# Patient Record
Sex: FEMALE | Race: BLACK OR AFRICAN AMERICAN | NOT HISPANIC OR LATINO
[De-identification: names, ages, dates, MRNs, and addresses within clinical notes are randomized per-mention and may not be internally consistent; named-entity substitution may affect disease eponyms.]

---

## 2017-07-18 ENCOUNTER — FORM ENCOUNTER (OUTPATIENT)
Age: 72
End: 2017-07-18

## 2017-07-19 ENCOUNTER — OUTPATIENT (OUTPATIENT)
Dept: OUTPATIENT SERVICES | Facility: HOSPITAL | Age: 72
LOS: 1 days | End: 2017-07-19
Payer: MEDICARE

## 2017-07-19 ENCOUNTER — APPOINTMENT (OUTPATIENT)
Dept: SPINE | Facility: CLINIC | Age: 72
End: 2017-07-19

## 2017-07-19 VITALS
OXYGEN SATURATION: 95 % | SYSTOLIC BLOOD PRESSURE: 153 MMHG | HEART RATE: 81 BPM | HEIGHT: 68 IN | WEIGHT: 205 LBS | DIASTOLIC BLOOD PRESSURE: 94 MMHG | BODY MASS INDEX: 31.07 KG/M2

## 2017-07-19 DIAGNOSIS — Z90.89 ACQUIRED ABSENCE OF OTHER ORGANS: Chronic | ICD-10-CM

## 2017-07-19 DIAGNOSIS — M43.10 SPONDYLOLISTHESIS, SITE UNSPECIFIED: ICD-10-CM

## 2017-07-19 DIAGNOSIS — M48.06 SPINAL STENOSIS, LUMBAR REGION: ICD-10-CM

## 2017-07-19 DIAGNOSIS — Z98.89 OTHER SPECIFIED POSTPROCEDURAL STATES: Chronic | ICD-10-CM

## 2017-07-19 DIAGNOSIS — M54.5 LOW BACK PAIN: ICD-10-CM

## 2017-07-19 PROCEDURE — 72100 X-RAY EXAM L-S SPINE 2/3 VWS: CPT | Mod: 26

## 2017-07-19 PROCEDURE — 72100 X-RAY EXAM L-S SPINE 2/3 VWS: CPT

## 2019-02-19 ENCOUNTER — APPOINTMENT (OUTPATIENT)
Dept: UROGYNECOLOGY | Facility: CLINIC | Age: 74
End: 2019-02-19
Payer: MEDICARE

## 2019-02-19 VITALS
OXYGEN SATURATION: 98 % | HEIGHT: 68 IN | BODY MASS INDEX: 31.07 KG/M2 | SYSTOLIC BLOOD PRESSURE: 156 MMHG | WEIGHT: 205 LBS | HEART RATE: 84 BPM | TEMPERATURE: 98.4 F | DIASTOLIC BLOOD PRESSURE: 98 MMHG

## 2019-02-19 DIAGNOSIS — Z80.3 FAMILY HISTORY OF MALIGNANT NEOPLASM OF BREAST: ICD-10-CM

## 2019-02-19 DIAGNOSIS — R39.13 SPLITTING OF URINARY STREAM: ICD-10-CM

## 2019-02-19 DIAGNOSIS — Z87.828 PERSONAL HISTORY OF OTHER (HEALED) PHYSICAL INJURY AND TRAUMA: ICD-10-CM

## 2019-02-19 DIAGNOSIS — Z83.3 FAMILY HISTORY OF DIABETES MELLITUS: ICD-10-CM

## 2019-02-19 DIAGNOSIS — N95.2 POSTMENOPAUSAL ATROPHIC VAGINITIS: ICD-10-CM

## 2019-02-19 DIAGNOSIS — Z82.49 FAMILY HISTORY OF ISCHEMIC HEART DISEASE AND OTHER DISEASES OF THE CIRCULATORY SYSTEM: ICD-10-CM

## 2019-02-19 LAB
BILIRUB UR QL STRIP: NORMAL
CLARITY UR: CLEAR
COLLECTION METHOD: NORMAL
GLUCOSE UR-MCNC: NORMAL
HCG UR QL: 1 EU/DL
HGB UR QL STRIP.AUTO: NORMAL
KETONES UR-MCNC: NORMAL
LEUKOCYTE ESTERASE UR QL STRIP: NORMAL
NITRITE UR QL STRIP: NORMAL
PH UR STRIP: 6.5
PROT UR STRIP-MCNC: NORMAL
SP GR UR STRIP: 1.01

## 2019-02-19 PROCEDURE — 99204 OFFICE O/P NEW MOD 45 MIN: CPT | Mod: 25

## 2019-02-19 PROCEDURE — 51725 SIMPLE CYSTOMETROGRAM: CPT

## 2019-02-19 PROCEDURE — 51736 URINE FLOW MEASUREMENT: CPT

## 2019-02-19 RX ORDER — ESTRADIOL 0.1 MG/G
0.1 CREAM VAGINAL
Qty: 1 | Refills: 0 | Status: ACTIVE | COMMUNITY
Start: 2019-02-19 | End: 1900-01-01

## 2019-02-19 NOTE — DISCUSSION/SUMMARY
[FreeTextEntry1] : 73 YEAR OLD WITH UTERINE PROLAPSE, ATROPHY AND POTENTIAL INCONTINENCE.  WE DISCUSSED SURGICAL AND NO SURGEICAL OPTIONS.  SHE REQUESTS SURGICAL INTERVENTION.  ANATOMY LIKELY ACCEPTABLE FOR VAGINAL HYSTERECTOMY AND SUSPENSION.  uLTRASOUND ORDERED FOR UTERINE DIMENSIONS.  \par \par URODYNAMICS, CYSTOSCOPY, AND PRE-OPERATIVE COUNSELING ARE INDICATED.

## 2019-02-19 NOTE — PROCEDURE
[First Sensation: _____ ml] : first sensation at [unfilled] ml [Fullness: ____ ml] : fullness at [unfilled] ml [FPC ____ml] : FPC [unfilled] ml [Sensory Urgency] : sensory urgency was observed [Involuntary Detrusor Contraction] : involuntary detrusor contraction was observed [FreeTextEntry1] : LOW CAPACITY, DETRUSOR INSTABILITY

## 2019-02-19 NOTE — HISTORY OF PRESENT ILLNESS
[Cystocele (Obstetric)] : frequent [Rectal Prolapse] : frequent [Feelings Of Urinary Urgency] : frequent [Pain During Urination (Dysuria)] : frequent [] : months ago [Vaginal Pain] : rare [de-identified] : from bulge sensation

## 2019-02-20 PROBLEM — Z87.828 HISTORY OF BACK INJURY: Status: RESOLVED | Noted: 2019-02-20 | Resolved: 2019-02-20

## 2019-02-20 PROBLEM — Z80.3 FAMILY HISTORY OF MALIGNANT NEOPLASM OF BREAST: Status: ACTIVE | Noted: 2019-02-20

## 2019-02-20 PROBLEM — Z83.3 FAMILY HISTORY OF DIABETES MELLITUS: Status: ACTIVE | Noted: 2019-02-20

## 2019-02-20 PROBLEM — Z82.49 FAMILY HISTORY OF HYPERTENSION: Status: ACTIVE | Noted: 2019-02-20

## 2019-02-20 LAB
APPEARANCE: CLEAR
BACTERIA: NEGATIVE
BILIRUBIN URINE: NEGATIVE
BLOOD URINE: NEGATIVE
COLOR: YELLOW
GLUCOSE QUALITATIVE U: NEGATIVE
HYALINE CASTS: 0 /LPF
KETONES URINE: NEGATIVE
LEUKOCYTE ESTERASE URINE: NEGATIVE
MICROSCOPIC-UA: NORMAL
NITRITE URINE: NEGATIVE
PH URINE: 6.5
PROTEIN URINE: NEGATIVE
RED BLOOD CELLS URINE: 2 /HPF
SPECIFIC GRAVITY URINE: 1.02
SQUAMOUS EPITHELIAL CELLS: 0 /HPF
UROBILINOGEN URINE: NORMAL
WHITE BLOOD CELLS URINE: 0 /HPF

## 2019-02-21 ENCOUNTER — RESULT REVIEW (OUTPATIENT)
Age: 74
End: 2019-02-21

## 2019-02-21 LAB — BACTERIA UR CULT: NORMAL

## 2019-03-08 ENCOUNTER — OUTPATIENT (OUTPATIENT)
Dept: OUTPATIENT SERVICES | Facility: HOSPITAL | Age: 74
LOS: 1 days | End: 2019-03-08
Payer: MEDICARE

## 2019-03-08 ENCOUNTER — APPOINTMENT (OUTPATIENT)
Dept: UROGYNECOLOGY | Facility: CLINIC | Age: 74
End: 2019-03-08
Payer: MEDICARE

## 2019-03-08 DIAGNOSIS — R35.0 FREQUENCY OF MICTURITION: ICD-10-CM

## 2019-03-08 DIAGNOSIS — Z90.89 ACQUIRED ABSENCE OF OTHER ORGANS: Chronic | ICD-10-CM

## 2019-03-08 DIAGNOSIS — Z98.89 OTHER SPECIFIED POSTPROCEDURAL STATES: Chronic | ICD-10-CM

## 2019-03-08 PROCEDURE — 52000 CYSTOURETHROSCOPY: CPT

## 2019-03-11 ENCOUNTER — OUTPATIENT (OUTPATIENT)
Dept: OUTPATIENT SERVICES | Facility: HOSPITAL | Age: 74
LOS: 1 days | End: 2019-03-11
Payer: MEDICARE

## 2019-03-11 ENCOUNTER — APPOINTMENT (OUTPATIENT)
Dept: UROGYNECOLOGY | Facility: CLINIC | Age: 74
End: 2019-03-11
Payer: MEDICARE

## 2019-03-11 ENCOUNTER — RESULT REVIEW (OUTPATIENT)
Age: 74
End: 2019-03-11

## 2019-03-11 VITALS
RESPIRATION RATE: 20 BRPM | HEART RATE: 73 BPM | TEMPERATURE: 98 F | DIASTOLIC BLOOD PRESSURE: 86 MMHG | SYSTOLIC BLOOD PRESSURE: 150 MMHG | HEIGHT: 68 IN | OXYGEN SATURATION: 97 % | WEIGHT: 205.91 LBS

## 2019-03-11 DIAGNOSIS — Z01.818 ENCOUNTER FOR OTHER PREPROCEDURAL EXAMINATION: ICD-10-CM

## 2019-03-11 DIAGNOSIS — N81.4 UTEROVAGINAL PROLAPSE, UNSPECIFIED: ICD-10-CM

## 2019-03-11 DIAGNOSIS — Z90.89 ACQUIRED ABSENCE OF OTHER ORGANS: Chronic | ICD-10-CM

## 2019-03-11 DIAGNOSIS — N99.3 PROLAPSE OF VAGINAL VAULT AFTER HYSTERECTOMY: ICD-10-CM

## 2019-03-11 DIAGNOSIS — Z98.89 OTHER SPECIFIED POSTPROCEDURAL STATES: Chronic | ICD-10-CM

## 2019-03-11 DIAGNOSIS — E87.6 HYPOKALEMIA: ICD-10-CM

## 2019-03-11 DIAGNOSIS — Z29.9 ENCOUNTER FOR PROPHYLACTIC MEASURES, UNSPECIFIED: ICD-10-CM

## 2019-03-11 DIAGNOSIS — I10 ESSENTIAL (PRIMARY) HYPERTENSION: ICD-10-CM

## 2019-03-11 DIAGNOSIS — R39.15 URGENCY OF URINATION: ICD-10-CM

## 2019-03-11 DIAGNOSIS — N39.3 STRESS INCONTINENCE (FEMALE) (MALE): ICD-10-CM

## 2019-03-11 LAB
BLD GP AB SCN SERPL QL: NEGATIVE — SIGNIFICANT CHANGE UP
HBA1C BLD-MCNC: 5.9 % — HIGH (ref 4–5.6)
POTASSIUM SERPL-MCNC: 4.2 MMOL/L — SIGNIFICANT CHANGE UP (ref 3.5–5.3)
POTASSIUM SERPL-SCNC: 4.2 MMOL/L — SIGNIFICANT CHANGE UP (ref 3.5–5.3)
RH IG SCN BLD-IMP: POSITIVE — SIGNIFICANT CHANGE UP

## 2019-03-11 PROCEDURE — 51729 CYSTOMETROGRAM W/VP&UP: CPT

## 2019-03-11 PROCEDURE — G0463: CPT

## 2019-03-11 PROCEDURE — 51729 CYSTOMETROGRAM W/VP&UP: CPT | Mod: 26

## 2019-03-11 PROCEDURE — 51797 INTRAABDOMINAL PRESSURE TEST: CPT | Mod: 26

## 2019-03-11 PROCEDURE — 86900 BLOOD TYPING SEROLOGIC ABO: CPT

## 2019-03-11 PROCEDURE — 84132 ASSAY OF SERUM POTASSIUM: CPT

## 2019-03-11 PROCEDURE — 86901 BLOOD TYPING SEROLOGIC RH(D): CPT

## 2019-03-11 PROCEDURE — 51784 ANAL/URINARY MUSCLE STUDY: CPT | Mod: 26

## 2019-03-11 PROCEDURE — 51784 ANAL/URINARY MUSCLE STUDY: CPT

## 2019-03-11 PROCEDURE — 86850 RBC ANTIBODY SCREEN: CPT

## 2019-03-11 PROCEDURE — 83036 HEMOGLOBIN GLYCOSYLATED A1C: CPT

## 2019-03-11 PROCEDURE — 51797 INTRAABDOMINAL PRESSURE TEST: CPT

## 2019-03-11 RX ORDER — CEFOTETAN DISODIUM 1 G
2 VIAL (EA) INJECTION ONCE
Qty: 0 | Refills: 0 | Status: DISCONTINUED | OUTPATIENT
Start: 2019-03-25 | End: 2019-03-29

## 2019-03-11 NOTE — H&P PST ADULT - MALLAMPATI CLASS
Class II - visualization of the soft palate, fauces, and uvula 14.25 inch neck/Class II - visualization of the soft palate, fauces, and uvula

## 2019-03-11 NOTE — H&P PST ADULT - PROBLEM SELECTOR PLAN 1
Midurethral Sling, Cystoscopy ,Anterior  colporrhaphy Total Vaginal Hysterectomy Uterosacral Suspension on 3/25/2019.    Pre- Op instruction discussed  CBc,BMP, reviewed on file   type and screen HgA1c sent ,Potassium repeated

## 2019-03-11 NOTE — H&P PST ADULT - HISTORY OF PRESENT ILLNESS
72 y/o female with PMH of HTN, lumbar stenosis s/p laminectomy  and fusion x 2(2012,2016) . Pt has been experiencing  vaginal bulging followed by GYN . Presents to PSt for scheduled Midurethral Sling, Cystoscopy ,Anterior  colporrhaphy Total Vaginal Hysterectomy Uterosacral Suspension on 3/25/2019. 74 y/o female with PMH of HTN, lumbar stenosis s/p laminectomy  and fusion x 2(2012,2016) . Pt has been experiencing  vaginal bulging increased urinary frequency, incontinence followed by GYN . Presents to PST for scheduled Midurethral Sling, Cystoscopy ,Anterior  colporrhaphy Total Vaginal Hysterectomy Uterosacral Suspension on 3/25/2019. 74 y/o female with PMH of HTN, lumbar stenosis s/p laminectomy  and fusion x 2(2012,2016). Pt has been experiencing  vaginal bulging increased urinary frequency, incontinence followed by GYN. Presents to PST for scheduled Midurethral Sling, Cystoscopy ,Anterior  colporrhaphy Total Vaginal Hysterectomy Uterosacral Suspension on 3/25/2019. 74 y/o female with PMH of HTN, lumbar stenosis s/p laminectomy  and fusion x 2(2012,2016). Pt has been experiencing vaginal bulging increased urinary frequency, incontinence followed by GYN. Presents to PST for scheduled Midurethral Sling, Cystoscopy ,Anterior  colporrhaphy Total Vaginal Hysterectomy Uterosacral Suspension on 3/25/2019.

## 2019-03-11 NOTE — H&P PST ADULT - PMH
Asthma  Pt has not had asthma in 5 years  H/O coronary angioplasty    HTN (hypertension)  on meds  Lumbar spinal stenosis    Osteoporosis Asthma  Pt has not had asthma in 5 years  Cystocele, midline    HTN (hypertension)  on meds  Lumbar spinal stenosis    Osteoporosis    Stress incontinence in female    Uterine prolapse

## 2019-03-11 NOTE — H&P PST ADULT - ASSESSMENT
CAPRINI SCORE [CLOT updated 18]    AGE RELATED RISK FACTORS                                                       MOBILITY RELATED FACTORS  [ ] Age 41-60 years                                            (1 Point)                    [ ] Bed rest                                                        (1 Point)  [ x] Age: 61-74 years                                           (2 Points)                  [ ] Plaster cast                                                   (2 Points)  [ ] Age= 75 years                                              (3 Points)                    [ ] Bed bound for more than 72 hours                 (2 Points)    DISEASE RELATED RISK FACTORS                                               GENDER SPECIFIC FACTORS  [ x] Edema in the lower extremities                       (1 Point)              [ ] Pregnancy                                                     (1 Point)  [ ] Varicose veins                                               (1 Point)                     [ ] Post-partum < 6 weeks                                   (1 Point)             [x ] BMI > 25 Kg/m2                                            (1 Point)                     [ ] Hormonal therapy  or oral contraception          (1 Point)                 [ ] Sepsis (in the previous month)                        (1 Point)               [ ] History of pregnancy complications                 (1 point)  [ ] Pneumonia or serious lung disease                                               [ ] Unexplained or recurrent                     (1 Point)           (in the previous month)                               (1 Point)  [ ] Abnormal pulmonary function test                     (1 Point)                 SURGERY RELATED RISK FACTORS  [ ] Acute myocardial infarction                              (1 Point)               [ ]  Section                                             (1 Point)  [ ] Congestive heart failure (in the previous month)  (1 Point)      [ ] Minor surgery                                                  (1 Point)   [ ] Inflammatory bowel disease                             (1 Point)               [ ] Arthroscopic surgery                                        (2 Points)  [ ] Central venous access                                      (2 Points)                [x ] General surgery lasting more than 45 minutes (2 points)  [ ] Present or previous malignancy                     (2 Points)                [ ] Elective arthroplasty                                         (5 points)    [ ] Stroke (in the previous month)                          (5 Points)                                                                                                                                                           HEMATOLOGY RELATED FACTORS                                                 TRAUMA RELATED RISK FACTORS  [ ] Prior episodes of VTE                                     (3 Points)                [ ] Fracture of the hip, pelvis, or leg                       (5 Points)  [ ] Positive family history for VTE                         (3 Points)             [ ] Acute spinal cord injury (in the previous month)  (5 Points)  [ ] Prothrombin 90986 A                                     (3 Points)               [ ] Paralysis  (less than 1 month)                             (5 Points)  [ ] Factor V Leiden                                             (3 Points)                  [ ] Multiple Trauma within 1 month                        (5 Points)  [ ] Lupus anticoagulants                                     (3 Points)                                                           [ ] Anticardiolipin antibodies                               (3 Points)                                                       [ ] High homocysteine in the blood                      (3 Points)                                             [ ] Other congenital or acquired thrombophilia      (3 Points)                                                [ ] Heparin induced thrombocytopenia                  (3 Points)                                     Total Score [    6      ]

## 2019-03-11 NOTE — H&P PST ADULT - PSH
H/O adenoidectomy    H/O laminectomy  L4-L5- 2012 , 2016  S/P colonoscopy  2004 and 2009,2017  S/P tonsillectomy  1974  S/P tubal ligation  1977

## 2019-03-11 NOTE — H&P PST ADULT - ATTENDING COMMENTS
Patient with advanced pelvic prolapse and potential incontinence admitted for vaginal hysterectomy, uterosacral suspension, anterior repair, sling, possible posterior repair.  Risks benefits and alternatives reviewed in detail.

## 2019-03-12 ENCOUNTER — APPOINTMENT (OUTPATIENT)
Dept: UROGYNECOLOGY | Facility: CLINIC | Age: 74
End: 2019-03-12
Payer: MEDICARE

## 2019-03-12 DIAGNOSIS — R39.15 URGENCY OF URINATION: ICD-10-CM

## 2019-03-12 DIAGNOSIS — R35.0 FREQUENCY OF MICTURITION: ICD-10-CM

## 2019-03-12 DIAGNOSIS — N81.9 FEMALE GENITAL PROLAPSE, UNSPECIFIED: ICD-10-CM

## 2019-03-12 PROCEDURE — 99214 OFFICE O/P EST MOD 30 MIN: CPT

## 2019-03-12 NOTE — DISCUSSION/SUMMARY
[FreeTextEntry1] : 73 year old with pelvic prolapse here for surgery discussion and counseling.  All surgical and non surgical options reviewed.  She elects for vaginal hysterectomy, uterosacral suspension and sling.  \par \par  She has been counseled regarding options for reconstructive surgery. This includes both abdominal, laparoscopic, robotic,and vaginal options. She has elected for the vaginal approach to surgery. Based on our discussion she will have a vaginal hysterectomy, uterosacral ligament suspension and anterior and posterior repair. The options for repairs with and without the use of mesh and biological materials were reviewed. Based on our discussion she would like to go with a native tissue repair. For her stress incontinence, she desires a midurethral sling with mesh. She expressed understanding that mesh will be used for the antiincontinence procedure. We discussed success rates, failure rates, and possible risks. The risks discussed include, but are not limited to: changes to the vaginal anatomy, vaginal pain, bleeding, pelvic pain, dyspareunia, need for revision, vaginal discharge, urinary retention, development or worsening of stress urinary incontinence or urge incontinence, infection, failure of the procedure, neuropathy. We also extensively reviewed the risk of injury to the bowel, rectum, bladder, ureters, urethra, blood vessels, and nerves. We discussed the risk of sling mesh erosion and need for sling revision. We discussed the risk of fistula formation and possible conversion to laparotomy or laparoscopy. We also discussed possible change in bowel habits, with improvement or worsening of constipation. We discussed the possibility of removing the ovaries/fallopian tubes and we indicated the ovaries would be removed only if they appeared grossly abnormal, otherwise they would remain in-situ.  She understands that retaining the ovaries does maintain a risk of ovarian cancer. All risks were explained in layman's terms.  Based on our discussion she would like to proceed with transvaginal hysterectomy, uterosacral suspension and anterior and posterior repair, midurethral sling, cystoscopy. We reviewed the preoperative and postoperative instructions as well as hospital stay. Consent was signed in the office. IUGA information on recovery from vaginal surgery provided and written pre and postop instructions provided. \par \par \par Patient requires sonogram for uterine size. \par \par

## 2019-03-14 PROBLEM — N39.3 STRESS INCONTINENCE (FEMALE) (MALE): Chronic | Status: ACTIVE | Noted: 2019-03-11

## 2019-03-14 PROBLEM — N81.4 UTEROVAGINAL PROLAPSE, UNSPECIFIED: Chronic | Status: ACTIVE | Noted: 2019-03-11

## 2019-03-14 PROBLEM — N81.11 CYSTOCELE, MIDLINE: Chronic | Status: ACTIVE | Noted: 2019-03-11

## 2019-03-15 ENCOUNTER — OUTPATIENT (OUTPATIENT)
Dept: OUTPATIENT SERVICES | Facility: HOSPITAL | Age: 74
LOS: 1 days | End: 2019-03-15
Payer: MEDICARE

## 2019-03-15 ENCOUNTER — APPOINTMENT (OUTPATIENT)
Dept: ULTRASOUND IMAGING | Facility: HOSPITAL | Age: 74
End: 2019-03-15
Payer: MEDICARE

## 2019-03-15 DIAGNOSIS — N81.9 FEMALE GENITAL PROLAPSE, UNSPECIFIED: ICD-10-CM

## 2019-03-15 DIAGNOSIS — Z98.89 OTHER SPECIFIED POSTPROCEDURAL STATES: Chronic | ICD-10-CM

## 2019-03-15 DIAGNOSIS — Z90.89 ACQUIRED ABSENCE OF OTHER ORGANS: Chronic | ICD-10-CM

## 2019-03-15 PROCEDURE — 76830 TRANSVAGINAL US NON-OB: CPT

## 2019-03-15 PROCEDURE — 76856 US EXAM PELVIC COMPLETE: CPT

## 2019-03-15 PROCEDURE — 76830 TRANSVAGINAL US NON-OB: CPT | Mod: 26

## 2019-03-15 PROCEDURE — 76856 US EXAM PELVIC COMPLETE: CPT | Mod: 26

## 2019-03-24 ENCOUNTER — TRANSCRIPTION ENCOUNTER (OUTPATIENT)
Age: 74
End: 2019-03-24

## 2019-03-25 ENCOUNTER — INPATIENT (INPATIENT)
Facility: HOSPITAL | Age: 74
LOS: 3 days | Discharge: HOME | DRG: 743 | End: 2019-03-29
Attending: UROLOGY | Admitting: UROLOGY
Payer: MEDICARE

## 2019-03-25 ENCOUNTER — RESULT REVIEW (OUTPATIENT)
Age: 74
End: 2019-03-25

## 2019-03-25 ENCOUNTER — APPOINTMENT (OUTPATIENT)
Dept: UROGYNECOLOGY | Facility: HOSPITAL | Age: 74
End: 2019-03-25
Payer: MEDICARE

## 2019-03-25 VITALS
TEMPERATURE: 99 F | OXYGEN SATURATION: 99 % | WEIGHT: 205.03 LBS | RESPIRATION RATE: 19 BRPM | DIASTOLIC BLOOD PRESSURE: 90 MMHG | HEART RATE: 80 BPM | SYSTOLIC BLOOD PRESSURE: 148 MMHG | HEIGHT: 68 IN

## 2019-03-25 DIAGNOSIS — N39.3 STRESS INCONTINENCE (FEMALE) (MALE): ICD-10-CM

## 2019-03-25 DIAGNOSIS — Z90.89 ACQUIRED ABSENCE OF OTHER ORGANS: Chronic | ICD-10-CM

## 2019-03-25 DIAGNOSIS — N99.3 PROLAPSE OF VAGINAL VAULT AFTER HYSTERECTOMY: ICD-10-CM

## 2019-03-25 DIAGNOSIS — Z98.89 OTHER SPECIFIED POSTPROCEDURAL STATES: Chronic | ICD-10-CM

## 2019-03-25 LAB
ALBUMIN SERPL ELPH-MCNC: 4.3 G/DL — SIGNIFICANT CHANGE UP (ref 3.3–5)
ALP SERPL-CCNC: 85 U/L — SIGNIFICANT CHANGE UP (ref 40–120)
ALT FLD-CCNC: 18 U/L — SIGNIFICANT CHANGE UP (ref 10–45)
ANION GAP SERPL CALC-SCNC: 18 MMOL/L — HIGH (ref 5–17)
AST SERPL-CCNC: 29 U/L — SIGNIFICANT CHANGE UP (ref 10–40)
BILIRUB SERPL-MCNC: 0.4 MG/DL — SIGNIFICANT CHANGE UP (ref 0.2–1.2)
BUN SERPL-MCNC: 10 MG/DL — SIGNIFICANT CHANGE UP (ref 7–23)
CALCIUM SERPL-MCNC: 9.2 MG/DL — SIGNIFICANT CHANGE UP (ref 8.4–10.5)
CHLORIDE SERPL-SCNC: 98 MMOL/L — SIGNIFICANT CHANGE UP (ref 96–108)
CO2 SERPL-SCNC: 20 MMOL/L — LOW (ref 22–31)
CREAT SERPL-MCNC: 0.66 MG/DL — SIGNIFICANT CHANGE UP (ref 0.5–1.3)
GLUCOSE BLDC GLUCOMTR-MCNC: 95 MG/DL — SIGNIFICANT CHANGE UP (ref 70–99)
GLUCOSE SERPL-MCNC: 166 MG/DL — HIGH (ref 70–99)
HCT VFR BLD CALC: 42.2 % — SIGNIFICANT CHANGE UP (ref 34.5–45)
HGB BLD-MCNC: 14.8 G/DL — SIGNIFICANT CHANGE UP (ref 11.5–15.5)
MCHC RBC-ENTMCNC: 30.9 PG — SIGNIFICANT CHANGE UP (ref 27–34)
MCHC RBC-ENTMCNC: 35 GM/DL — SIGNIFICANT CHANGE UP (ref 32–36)
MCV RBC AUTO: 88.3 FL — SIGNIFICANT CHANGE UP (ref 80–100)
PLATELET # BLD AUTO: 165 K/UL — SIGNIFICANT CHANGE UP (ref 150–400)
POTASSIUM SERPL-MCNC: 3.6 MMOL/L — SIGNIFICANT CHANGE UP (ref 3.5–5.3)
POTASSIUM SERPL-SCNC: 3.6 MMOL/L — SIGNIFICANT CHANGE UP (ref 3.5–5.3)
PROT SERPL-MCNC: 8.1 G/DL — SIGNIFICANT CHANGE UP (ref 6–8.3)
RBC # BLD: 4.78 M/UL — SIGNIFICANT CHANGE UP (ref 3.8–5.2)
RBC # FLD: 12.9 % — SIGNIFICANT CHANGE UP (ref 10.3–14.5)
SODIUM SERPL-SCNC: 136 MMOL/L — SIGNIFICANT CHANGE UP (ref 135–145)
WBC # BLD: 8.8 K/UL — SIGNIFICANT CHANGE UP (ref 3.8–10.5)
WBC # FLD AUTO: 8.8 K/UL — SIGNIFICANT CHANGE UP (ref 3.8–10.5)

## 2019-03-25 PROCEDURE — 57288 REPAIR BLADDER DEFECT: CPT

## 2019-03-25 PROCEDURE — 58260 VAGINAL HYSTERECTOMY: CPT

## 2019-03-25 PROCEDURE — 57265 CMBN AP COLPRHY W/NTRCL RPR: CPT

## 2019-03-25 PROCEDURE — 88305 TISSUE EXAM BY PATHOLOGIST: CPT | Mod: 26

## 2019-03-25 RX ORDER — ONDANSETRON 8 MG/1
4 TABLET, FILM COATED ORAL ONCE
Qty: 0 | Refills: 0 | Status: COMPLETED | OUTPATIENT
Start: 2019-03-25 | End: 2019-03-25

## 2019-03-25 RX ORDER — HYDROMORPHONE HYDROCHLORIDE 2 MG/ML
0.25 INJECTION INTRAMUSCULAR; INTRAVENOUS; SUBCUTANEOUS
Qty: 0 | Refills: 0 | Status: DISCONTINUED | OUTPATIENT
Start: 2019-03-25 | End: 2019-03-25

## 2019-03-25 RX ORDER — LIDOCAINE HCL 20 MG/ML
0.2 VIAL (ML) INJECTION ONCE
Qty: 0 | Refills: 0 | Status: DISCONTINUED | OUTPATIENT
Start: 2019-03-25 | End: 2019-03-25

## 2019-03-25 RX ORDER — ACETAMINOPHEN 500 MG
1000 TABLET ORAL ONCE
Qty: 0 | Refills: 0 | Status: COMPLETED | OUTPATIENT
Start: 2019-03-25 | End: 2019-03-25

## 2019-03-25 RX ORDER — KETOROLAC TROMETHAMINE 30 MG/ML
15 SYRINGE (ML) INJECTION EVERY 6 HOURS
Qty: 0 | Refills: 0 | Status: DISCONTINUED | OUTPATIENT
Start: 2019-03-25 | End: 2019-03-26

## 2019-03-25 RX ORDER — HYDRALAZINE HCL 50 MG
5 TABLET ORAL ONCE
Qty: 0 | Refills: 0 | Status: COMPLETED | OUTPATIENT
Start: 2019-03-25 | End: 2019-03-25

## 2019-03-25 RX ORDER — SIMETHICONE 80 MG/1
80 TABLET, CHEWABLE ORAL
Qty: 0 | Refills: 0 | Status: DISCONTINUED | OUTPATIENT
Start: 2019-03-25 | End: 2019-03-29

## 2019-03-25 RX ORDER — SODIUM CHLORIDE 9 MG/ML
1000 INJECTION, SOLUTION INTRAVENOUS
Qty: 0 | Refills: 0 | Status: DISCONTINUED | OUTPATIENT
Start: 2019-03-25 | End: 2019-03-28

## 2019-03-25 RX ORDER — SODIUM CHLORIDE 9 MG/ML
1000 INJECTION, SOLUTION INTRAVENOUS
Qty: 0 | Refills: 0 | Status: DISCONTINUED | OUTPATIENT
Start: 2019-03-25 | End: 2019-03-25

## 2019-03-25 RX ORDER — CYCLOBENZAPRINE HYDROCHLORIDE 10 MG/1
5 TABLET, FILM COATED ORAL
Qty: 0 | Refills: 0 | Status: DISCONTINUED | OUTPATIENT
Start: 2019-03-25 | End: 2019-03-26

## 2019-03-25 RX ORDER — ACETAMINOPHEN 500 MG
1000 TABLET ORAL ONCE
Qty: 0 | Refills: 0 | Status: DISCONTINUED | OUTPATIENT
Start: 2019-03-25 | End: 2019-03-26

## 2019-03-25 RX ORDER — SODIUM CHLORIDE 9 MG/ML
3 INJECTION INTRAMUSCULAR; INTRAVENOUS; SUBCUTANEOUS EVERY 8 HOURS
Qty: 0 | Refills: 0 | Status: DISCONTINUED | OUTPATIENT
Start: 2019-03-25 | End: 2019-03-25

## 2019-03-25 RX ORDER — ENOXAPARIN SODIUM 100 MG/ML
40 INJECTION SUBCUTANEOUS EVERY 24 HOURS
Qty: 0 | Refills: 0 | Status: DISCONTINUED | OUTPATIENT
Start: 2019-03-25 | End: 2019-03-29

## 2019-03-25 RX ORDER — ACETAMINOPHEN 500 MG
1000 TABLET ORAL ONCE
Qty: 0 | Refills: 0 | Status: COMPLETED | OUTPATIENT
Start: 2019-03-26 | End: 2019-03-26

## 2019-03-25 RX ORDER — FAMOTIDINE 10 MG/ML
20 INJECTION INTRAVENOUS ONCE
Qty: 0 | Refills: 0 | Status: COMPLETED | OUTPATIENT
Start: 2019-03-25 | End: 2019-03-25

## 2019-03-25 RX ADMIN — Medication 15 MILLIGRAM(S): at 23:08

## 2019-03-25 RX ADMIN — ONDANSETRON 4 MILLIGRAM(S): 8 TABLET, FILM COATED ORAL at 15:39

## 2019-03-25 RX ADMIN — FAMOTIDINE 20 MILLIGRAM(S): 10 INJECTION INTRAVENOUS at 08:22

## 2019-03-25 RX ADMIN — Medication 1000 MILLIGRAM(S): at 16:04

## 2019-03-25 RX ADMIN — ENOXAPARIN SODIUM 40 MILLIGRAM(S): 100 INJECTION SUBCUTANEOUS at 23:08

## 2019-03-25 RX ADMIN — CYCLOBENZAPRINE HYDROCHLORIDE 5 MILLIGRAM(S): 10 TABLET, FILM COATED ORAL at 17:22

## 2019-03-25 RX ADMIN — Medication 400 MILLIGRAM(S): at 15:36

## 2019-03-25 RX ADMIN — ONDANSETRON 4 MILLIGRAM(S): 8 TABLET, FILM COATED ORAL at 23:32

## 2019-03-25 RX ADMIN — Medication 15 MILLIGRAM(S): at 17:27

## 2019-03-25 RX ADMIN — ONDANSETRON 4 MILLIGRAM(S): 8 TABLET, FILM COATED ORAL at 19:07

## 2019-03-25 RX ADMIN — Medication 5 MILLIGRAM(S): at 16:36

## 2019-03-25 RX ADMIN — Medication 15 MILLIGRAM(S): at 17:09

## 2019-03-25 NOTE — PROGRESS NOTE ADULT - SUBJECTIVE AND OBJECTIVE BOX
POST-OP CHECK  PA Note    Allergies    methylPREDNISolone (Nausea; Other; Vomiting)  Perfumes- Pt gets asthma (Short breath)    S:  Pt sleeping, easily arousable  Pt c/o lower abdominal pain and right thigh pain- just received dilaudid. Pt denies N/V, SOB, CP, or palpitations. Denies passing Flatus.  Pt still NPO.  Herrera in place    O:   T(C): 36 (03-25-19 @ 15:06), Max: 36 (03-25-19 @ 15:06)  HR: 75 (03-25-19 @ 17:00) (56 - 75)  BP: 151/78 (03-25-19 @ 17:00) (133/- - 173/86)  RR: 18 (03-25-19 @ 17:00) (14 - 18)  SpO2: 100% (03-25-19 @ 17:00) (94% - 100%)  I&O's Summary    25 Mar 2019 07:01  -  25 Mar 2019 17:07  --------------------------------------------------------  IN: 250 mL / OUT: 300 mL / NET: -50 mL                         OR           PACU  (I)                700              IVF LR@75  (O)                                  300cc over 2 hours in PACU  EBL               100    CV: RRR  Lungs: CTA B/L  Abd: +BS, soft, appropriately tender  Ext: SCDs in place, Neg Homans B/L, right thigh pain not reproducible    A/P: 73y Female S/P total vaginal hysterectomy, TVT, uterosacral suspension, cystoscopy POD #0  with  PAST MEDICAL & SURGICAL HISTORY:  Uterine prolapse  Cystocele, midline  Stress incontinence in female  Osteoporosis  Asthma: Pt has not had asthma in 5 years  HTN (hypertension): on meds  Lumbar spinal stenosis  H/O adenoidectomy  H/O laminectomy: L4-L5- 2012 , 2016  S/P colonoscopy: 2004 and 2009,2017  S/P tubal ligation: 1977  S/P tonsillectomy: 1974      -Neuro - AAO x 3, Dilaudid given for pain, will re evaluate pt pain controll  -Heme - clinically hemodynamically stable  -CV - asymptomatic, CBC in am, will hold HTN medication and restart in am  -Pulm - encourage IS, O2sat 100% on RA  -GI - Diet-  Regular as Tolerated  - - Herrera to gravity, will remove vaginal packing x 1 and perform active TOV in am  -DVT prophylaxis - SCDs in place, encourage ambulation, lovenox to be started 8 hours postop  -Pt with right thigh pain likely related to positioning in surgery, pain not reproducible on exam, will continue to observe  -Meds:   acetaminophen  IVPB .. 1000 milliGRAM(s) IV Intermittent once  cefoTEtan  IVPB 2 Gram(s) IV Intermittent once  cyclobenzaprine 5 milliGRAM(s) Oral two times a day PRN  enoxaparin Injectable 40 milliGRAM(s) SubCutaneous every 24 hours  HYDROmorphone  Injectable 0.25 milliGRAM(s) IV Push every 10 minutes PRN  ketorolac   Injectable 15 milliGRAM(s) IV Push every 6 hours  lactated ringers. 1000 milliLiter(s) IV Continuous <Continuous>  lactated ringers. 1000 milliLiter(s) IV Continuous <Continuous>    -Dispo: stable for transfer from PACU, once meeting all PACU milestones    Dalila Deshpande PA-C

## 2019-03-26 ENCOUNTER — RX RENEWAL (OUTPATIENT)
Age: 74
End: 2019-03-26

## 2019-03-26 DIAGNOSIS — Z98.890 OTHER SPECIFIED POSTPROCEDURAL STATES: ICD-10-CM

## 2019-03-26 LAB
CK MB BLD-MCNC: 1.4 % — SIGNIFICANT CHANGE UP (ref 0–3.5)
CK MB CFR SERPL CALC: 3.3 NG/ML — SIGNIFICANT CHANGE UP (ref 0–3.8)
CK MB CFR SERPL CALC: 6.4 NG/ML — HIGH (ref 0–3.8)
CK SERPL-CCNC: 229 U/L — HIGH (ref 25–170)
CK SERPL-CCNC: 456 U/L — HIGH (ref 25–170)
HCT VFR BLD CALC: 43.4 % — SIGNIFICANT CHANGE UP (ref 34.5–45)
HGB BLD-MCNC: 15.1 G/DL — SIGNIFICANT CHANGE UP (ref 11.5–15.5)
MCHC RBC-ENTMCNC: 30.6 PG — SIGNIFICANT CHANGE UP (ref 27–34)
MCHC RBC-ENTMCNC: 34.8 GM/DL — SIGNIFICANT CHANGE UP (ref 32–36)
MCV RBC AUTO: 87.8 FL — SIGNIFICANT CHANGE UP (ref 80–100)
PLATELET # BLD AUTO: 182 K/UL — SIGNIFICANT CHANGE UP (ref 150–400)
RBC # BLD: 4.94 M/UL — SIGNIFICANT CHANGE UP (ref 3.8–5.2)
RBC # FLD: 13.2 % — SIGNIFICANT CHANGE UP (ref 10.3–14.5)
TROPONIN T, HIGH SENSITIVITY RESULT: 7 NG/L — SIGNIFICANT CHANGE UP (ref 0–51)
TROPONIN T, HIGH SENSITIVITY RESULT: 9 NG/L — SIGNIFICANT CHANGE UP (ref 0–51)
WBC # BLD: 10.3 K/UL — SIGNIFICANT CHANGE UP (ref 3.8–10.5)
WBC # FLD AUTO: 10.3 K/UL — SIGNIFICANT CHANGE UP (ref 3.8–10.5)

## 2019-03-26 PROCEDURE — 99223 1ST HOSP IP/OBS HIGH 75: CPT

## 2019-03-26 PROCEDURE — 74019 RADEX ABDOMEN 2 VIEWS: CPT | Mod: 26

## 2019-03-26 PROCEDURE — 93010 ELECTROCARDIOGRAM REPORT: CPT

## 2019-03-26 PROCEDURE — 71275 CT ANGIOGRAPHY CHEST: CPT | Mod: 26

## 2019-03-26 RX ORDER — CYCLOBENZAPRINE HYDROCHLORIDE 10 MG/1
5 TABLET, FILM COATED ORAL ONCE
Qty: 0 | Refills: 0 | Status: COMPLETED | OUTPATIENT
Start: 2019-03-26 | End: 2019-03-26

## 2019-03-26 RX ORDER — IBUPROFEN 200 MG
600 TABLET ORAL EVERY 6 HOURS
Qty: 0 | Refills: 0 | Status: DISCONTINUED | OUTPATIENT
Start: 2019-03-26 | End: 2019-03-26

## 2019-03-26 RX ORDER — LOSARTAN POTASSIUM 100 MG/1
50 TABLET, FILM COATED ORAL DAILY
Qty: 0 | Refills: 0 | Status: DISCONTINUED | OUTPATIENT
Start: 2019-03-26 | End: 2019-03-27

## 2019-03-26 RX ORDER — KETOROLAC TROMETHAMINE 30 MG/ML
30 SYRINGE (ML) INJECTION EVERY 6 HOURS
Qty: 0 | Refills: 0 | Status: DISCONTINUED | OUTPATIENT
Start: 2019-03-26 | End: 2019-03-29

## 2019-03-26 RX ORDER — ACETAMINOPHEN 500 MG
975 TABLET ORAL EVERY 6 HOURS
Qty: 0 | Refills: 0 | Status: DISCONTINUED | OUTPATIENT
Start: 2019-03-26 | End: 2019-03-26

## 2019-03-26 RX ORDER — OXYCODONE HYDROCHLORIDE 5 MG/1
5 TABLET ORAL EVERY 4 HOURS
Qty: 0 | Refills: 0 | Status: DISCONTINUED | OUTPATIENT
Start: 2019-03-26 | End: 2019-03-26

## 2019-03-26 RX ORDER — ONDANSETRON 8 MG/1
4 TABLET, FILM COATED ORAL EVERY 6 HOURS
Qty: 0 | Refills: 0 | Status: DISCONTINUED | OUTPATIENT
Start: 2019-03-26 | End: 2019-03-29

## 2019-03-26 RX ORDER — ACETAMINOPHEN 500 MG
1000 TABLET ORAL ONCE
Qty: 0 | Refills: 0 | Status: COMPLETED | OUTPATIENT
Start: 2019-03-26 | End: 2019-03-26

## 2019-03-26 RX ORDER — FAMOTIDINE 10 MG/ML
20 INJECTION INTRAVENOUS ONCE
Qty: 0 | Refills: 0 | Status: DISCONTINUED | OUTPATIENT
Start: 2019-03-26 | End: 2019-03-27

## 2019-03-26 RX ORDER — METOPROLOL TARTRATE 50 MG
5 TABLET ORAL EVERY 6 HOURS
Qty: 0 | Refills: 0 | Status: DISCONTINUED | OUTPATIENT
Start: 2019-03-26 | End: 2019-03-26

## 2019-03-26 RX ORDER — ONDANSETRON 8 MG/1
4 TABLET, FILM COATED ORAL ONCE
Qty: 0 | Refills: 0 | Status: COMPLETED | OUTPATIENT
Start: 2019-03-26 | End: 2019-03-26

## 2019-03-26 RX ADMIN — CYCLOBENZAPRINE HYDROCHLORIDE 5 MILLIGRAM(S): 10 TABLET, FILM COATED ORAL at 02:31

## 2019-03-26 RX ADMIN — Medication 975 MILLIGRAM(S): at 09:58

## 2019-03-26 RX ADMIN — Medication 600 MILLIGRAM(S): at 09:48

## 2019-03-26 RX ADMIN — ENOXAPARIN SODIUM 40 MILLIGRAM(S): 100 INJECTION SUBCUTANEOUS at 23:35

## 2019-03-26 RX ADMIN — Medication 975 MILLIGRAM(S): at 08:41

## 2019-03-26 RX ADMIN — Medication 1000 MILLIGRAM(S): at 17:02

## 2019-03-26 RX ADMIN — Medication 400 MILLIGRAM(S): at 16:21

## 2019-03-26 RX ADMIN — SODIUM CHLORIDE 75 MILLILITER(S): 9 INJECTION, SOLUTION INTRAVENOUS at 17:14

## 2019-03-26 RX ADMIN — Medication 15 MILLIGRAM(S): at 00:00

## 2019-03-26 RX ADMIN — Medication 600 MILLIGRAM(S): at 11:18

## 2019-03-26 RX ADMIN — ONDANSETRON 4 MILLIGRAM(S): 8 TABLET, FILM COATED ORAL at 13:52

## 2019-03-26 RX ADMIN — LOSARTAN POTASSIUM 50 MILLIGRAM(S): 100 TABLET, FILM COATED ORAL at 08:06

## 2019-03-26 RX ADMIN — Medication 1000 MILLIGRAM(S): at 03:00

## 2019-03-26 RX ADMIN — Medication 400 MILLIGRAM(S): at 02:31

## 2019-03-26 NOTE — PROGRESS NOTE ADULT - SUBJECTIVE AND OBJECTIVE BOX
R2 Gyn Progress Note POD#1  HD#2    Subjective:   Pt seen and examined at bedside. Overnight, pt with elevated BPS 160s-170s systolic with associated atypical chest pain, primarily L chest radiating to back. Pt states she had this pain prior to surgery, but slightly worse post-op. S/p cardiology consult this AM.  Otherwise surgical pain well controlled. Patient not ambulating. Denies passing flatus. Tolerating regular diet. Pt denies fever, chills, SOB, nausea, vomiting, lightheadedness, dizziness.      Objective:  T(F): 99.1 (03-26-19 @ 04:59), Max: 99.1 (03-26-19 @ 04:59)  HR: 75 (03-26-19 @ 04:59) (20 - 81)  BP: 160/81 (03-26-19 @ 04:59) (133/- - 173/86)  RR: 18 (03-26-19 @ 04:59) (14 - 19)  SpO2: 99% (03-26-19 @ 04:59) (94% - 100%)  Wt(kg): --  I&O's Summary    25 Mar 2019 07:01  -  26 Mar 2019 06:56  --------------------------------------------------------  IN: 445 mL / OUT: 1925 mL / NET: -1480 mL      CAPILLARY BLOOD GLUCOSE      POCT Blood Glucose.: 95 mg/dL (25 Mar 2019 08:01)      MEDICATIONS  (STANDING):  acetaminophen  IVPB .. 1000 milliGRAM(s) IV Intermittent once  cefoTEtan  IVPB 2 Gram(s) IV Intermittent once  enoxaparin Injectable 40 milliGRAM(s) SubCutaneous every 24 hours  famotidine Injectable 20 milliGRAM(s) IV Push once  ketorolac   Injectable 15 milliGRAM(s) IV Push every 6 hours  lactated ringers. 1000 milliLiter(s) (75 mL/Hr) IV Continuous <Continuous>  simethicone 80 milliGRAM(s) Chew two times a day    MEDICATIONS  (PRN):  cyclobenzaprine 5 milliGRAM(s) Oral two times a day PRN Muscle Spasm      Physical Exam:  Constitutional: NAD, A+O x3  CV: RR S1S2 no m/r/g. Chest pain reproducible on L sternal border and back.  Lungs: CTA b/l, good air flow b/l  Abdomen: soft, softly-distended, appropriately-tender. no guarding, no rebound, normal bowel sounds  Extremities: no lower extremity edema or calf tenderness bilaterally; venodynes in place  :  x1 removed mildly saturated.     LABS:             14.8   8.8   )-----------( 165      ( 03-25 @ 21:56 )             42.2         03-25    136    |  98     |  10     ----------------------------<  166<H>  3.6     |  20<L>  |  0.66     Ca    9.2        25 Mar 2019 21:56    TPro  8.1    /  Alb  4.3    /  TBili  0.4    /  DBili  x      /  AST  29     /  ALT  18     /  AlkPhos  85     03-25    Troponin T, High Sensitivity (03.26.19 @ 02:19)    Troponin T, High Sensitivity Result: 7 R2 Gyn Progress Note POD#1  HD#2    Subjective:   Pt seen and examined at bedside. Overnight, pt with elevated BPS 160s-170s systolic with associated atypical chest pain, primarily L chest radiating to back. Pt states she had this pain prior to surgery, but slightly worse post-op. Overnight, she had an EKG that was reviewed by Cardiology at that time without any significant ST elevations. She was not tachycardic at time and remains so, and she has been saturating well on RA. She denied SOB or cough at that time adn continues to do so. Cardiac markers were obtained and normal. Her pain persisted despite medications and a CT PE protocol was negative. She was seen by cardiology this AM.  Otherwise surgical pain well controlled. Patient not ambulating. Denies passing flatus. Tolerating regular diet. Pt denies fever, chills, SOB, nausea, vomiting, lightheadedness, dizziness.      Objective:  T(F): 99.1 (03-26-19 @ 04:59), Max: 99.1 (03-26-19 @ 04:59)  HR: 75 (03-26-19 @ 04:59) (20 - 81)  BP: 160/81 (03-26-19 @ 04:59) (133/- - 173/86)  RR: 18 (03-26-19 @ 04:59) (14 - 19)  SpO2: 99% (03-26-19 @ 04:59) (94% - 100%)  Wt(kg): --  I&O's Summary    25 Mar 2019 07:01  -  26 Mar 2019 06:56  --------------------------------------------------------  IN: 445 mL / OUT: 1925 mL / NET: -1480 mL      CAPILLARY BLOOD GLUCOSE      POCT Blood Glucose.: 95 mg/dL (25 Mar 2019 08:01)      MEDICATIONS  (STANDING):  acetaminophen  IVPB .. 1000 milliGRAM(s) IV Intermittent once  cefoTEtan  IVPB 2 Gram(s) IV Intermittent once  enoxaparin Injectable 40 milliGRAM(s) SubCutaneous every 24 hours  famotidine Injectable 20 milliGRAM(s) IV Push once  ketorolac   Injectable 15 milliGRAM(s) IV Push every 6 hours  lactated ringers. 1000 milliLiter(s) (75 mL/Hr) IV Continuous <Continuous>  simethicone 80 milliGRAM(s) Chew two times a day    MEDICATIONS  (PRN):  cyclobenzaprine 5 milliGRAM(s) Oral two times a day PRN Muscle Spasm      Physical Exam:  Constitutional: NAD, A+O x3  CV: RR S1S2 no m/r/g. Chest pain reproducible on L sternal border and back.  Lungs: CTA b/l, good air flow b/l  Abdomen: soft, softly-distended, appropriately-tender. no guarding, no rebound, normal bowel sounds  Extremities: no lower extremity edema or calf tenderness bilaterally; venodynes in place  :  x1 removed mildly saturated.     LABS:               14.8   8.8   )-----------( 165      ( 03-25 @ 21:56 )             42.2       03-25    136    |  98     |  10     ----------------------------<  166<H>  3.6     |  20<L>  |  0.66     Ca    9.2        25 Mar 2019 21:56    TPro  8.1    /  Alb  4.3    /  TBili  0.4    /  DBili  x      /  AST  29     /  ALT  18     /  AlkPhos  85     03-25    Troponin T, High Sensitivity (03.26.19 @ 02:19)    Troponin T, High Sensitivity Result: 7

## 2019-03-26 NOTE — PROGRESS NOTE ADULT - ASSESSMENT
73y POD#1 s/p TVH, TVT, uterosacral suspension, cystoscopy, PMH significant for HTN, lumbar stenosis s/p laminectomy. Post-op course c/b atypical chest pain on POD#0, which she had prior to surgery. 73y POD#1 s/p TVH, TVT, uterosacral suspension, cystoscopy, PMH significant for HTN, lumbar stenosis s/p laminectomy. Post-op course c/b atypical chest pain on POD#0, which she had prior to surgery.     I agree with the above note and plan. Residents discussed the patient's status with me overnight/this early morning.  1) Post-op care  -Doing well post-operatively  -Tylenol, Motrin, Oxycodone, Flexeril prn pain  -IS and ambulation  -Packing removed this AM  -TOV when up and ambulating  -CBC pending for this AM  2) Atypical chest pain  -Reproducible on exam overnight and this AM  -S/p EKG and CT PE that were negative  -Trending troponins per cardiology - AM pending  -Cardiology consulted and suspect musculoskeletal pain - appreciate consult  3) HTN  -Will restart home meds this AM  4) FEN/GI  -Regular diet  -BMP pending  -Continue IV access  5) PPX  -Lovenox, SCDs  Dispo: Anticipate d/c home later today pending clinical status. F/u on AM labs.

## 2019-03-26 NOTE — CONSULT NOTE ADULT - SUBJECTIVE AND OBJECTIVE BOX
Cardiology Attending Consult Note      CHIEF COMPLAINT/REASON FOR CONSULT: chest pain, back pain    Date of Admission: 19    HISTORY OF PRESENT ILLNESS:    73y.o. Female with HTN, Lumbar stenosis s/p Fusion x 2 (, ), vaginal bulging now s/p CARMEN 2019, post-op course c/b chest and back pain (7-8/10, dull, non-radiating, worse with movement). At the time my visit she continues to have significant pain, which she reports awoke her from sleep. She denies SOB. No palpitations. She does report nausea. No fevers/chills.     hsTrop: 7  EK2019: NSR, no ST or TW Changes    ALLERGIES:  methylPREDNISolone (Nausea; Other; Vomiting)  Perfumes- Pt gets asthma (Short breath)    Home Medications:  olmesartan 20 mg oral tablet: 1 tab(s) orally once a day (25 Mar 2019 08:13)      MEDICATIONS:  enoxaparin Injectable 40 milliGRAM(s) SubCutaneous every 24 hours    cefoTEtan  IVPB 2 Gram(s) IV Intermittent once      acetaminophen  IVPB .. 1000 milliGRAM(s) IV Intermittent once  ketorolac   Injectable 15 milliGRAM(s) IV Push every 6 hours    famotidine Injectable 20 milliGRAM(s) IV Push once  simethicone 80 milliGRAM(s) Chew two times a day      lactated ringers. 1000 milliLiter(s) IV Continuous <Continuous>      PAST MEDICAL & SURGICAL HISTORY:  Uterine prolapse  Cystocele, midline  Stress incontinence in female  Osteoporosis  Asthma: Pt has not had asthma in 5 years  HTN (hypertension): on meds  Lumbar spinal stenosis  H/O adenoidectomy  H/O laminectomy: L4-L5-  ,   S/P colonoscopy: 2004 and ,  S/P tubal ligation:   S/P tonsillectomy: 1974      FAMILY HISTORY:  No hx of MI or CVA.    SOCIAL HISTORY:    Never smoked, no ETOH, no IVDU    REVIEW OF SYSTEMS:    CONSTITUTIONAL: No weakness, fevers or chills  EYES/ENT: No visual changes;  No vertigo or throat pain   NECK: No pain or stiffness  RESPIRATORY: No cough, wheezing, hemoptysis; No shortness of breath  CARDIOVASCULAR: No chest pain or palpitations  GASTROINTESTINAL: No abdominal or epigastric pain. No nausea, vomiting, or hematemesis; No diarrhea or constipation. No melena or hematochezia.  GENITOURINARY: No dysuria, frequency or hematuria  NEUROLOGICAL: No numbness or weakness  SKIN: No itching, burning, rashes, or lesions   All other review of systems is negative unless indicated above.    PHYSICAL EXAM:  T(C): 37.3 (19 @ 04:59), Max: 37.3 (19 @ 04:59)  HR: 75 (19 @ 04:59) (20 - 81)  BP: 160/81 (19 @ 04:59) (133/- - 173/86)  RR: 18 (19 @ 04:59) (14 - 19)  SpO2: 99% (19 @ 04:59) (94% - 100%)  Wt(kg): --    Drug Dosing Weight  Height (cm): 172.72 (25 Mar 2019 10:40)  Weight (kg): 93 (25 Mar 2019 10:40)  BMI (kg/m2): 31.2 (25 Mar 2019 10:40)  BSA (m2): 2.07 (25 Mar 2019 10:40)    I&O's Summary    25 Mar 2019 07:01  -  26 Mar 2019 06:41  --------------------------------------------------------  IN: 445 mL / OUT: 1925 mL / NET: -1480 mL        Appearance: Normal	  HEENT:   Normal oral mucosa, PERRL, EOMI	  Lymphatic: No lymphadenopathy  Cardiovascular: Normal S1 S2, No JVD, No murmurs  Respiratory: Lungs clear to auscultation	  Psychiatry: A & O x 3, Mood & affect appropriate  Gastrointestinal:  Soft, Non-tender, + BS	  Skin: No rashes, No ecchymoses, No cyanosis	  Neurologic: Non-focal  Extremities: Normal range of motion, No clubbing, cyanosis or edema  Vascular: Peripheral pulses palpable 2+ bilaterally    LABS:	 	    CBC Full  -  ( 25 Mar 2019 21:56 )  WBC Count : 8.8 K/uL  Hemoglobin : 14.8 g/dL  Hematocrit : 42.2 %  Platelet Count - Automated : 165 K/uL  Mean Cell Volume : 88.3 fl  Mean Cell Hemoglobin : 30.9 pg  Mean Cell Hemoglobin Concentration : 35.0 gm/dL  Auto Neutrophil # : x  Auto Lymphocyte # : x  Auto Monocyte # : x  Auto Eosinophil # : x  Auto Basophil # : x  Auto Neutrophil % : x  Auto Lymphocyte % : x  Auto Monocyte % : x  Auto Eosinophil % : x  Auto Basophil % : x        136  |  98  |  10  ----------------------------<  166<H>  3.6   |  20<L>  |  0.66    Ca    9.2      25 Mar 2019 21:56    TPro  8.1  /  Alb  4.3  /  TBili  0.4  /  DBili  x   /  AST  29  /  ALT  18  /  AlkPhos  85        LIVER FUNCTIONS - ( 25 Mar 2019 21:56 )  Alb: 4.3 g/dL / Pro: 8.1 g/dL / ALK PHOS: 85 U/L / ALT: 18 U/L / AST: 29 U/L / GGT: x               hsTrop: 7    EK2019: NSR, no ST or TW Changes    Telemetry: None    CT-Angio Chest: 2019:  Prelim report:  INTERPRETATION:  No central PE. B/L lower lobe subsegmental atelactasis      TTE: None      A/P: 73y.o. Female with HTN, Lumbar stenosis s/p Fusion x 2 (, ), vaginal bulging now s/p CARMEN 2019, post-op course c/b chest and back pain (7-8/10, dull, non-radiating, worse with movement). At the time my visit she continues to have significant pain, which she reports awoke her from sleep. She denies SOB. No palpitations. She does report nausea. No fevers/chills.     1. Chest/Back Pain - Patient with new onset atypical chest and back pain in the setting of recent surgery (CARMEN).   -Patient with reproducible component of pain upon chest wall and back palpation. Suspect this may be 2/2 musculoskeletal pain in this patient with multiple spinal fusions.  -Less likely represents acute atherothrombotic event as cardiac enzymes negative x 1. EKG without significant ST or TW changes. Doubt aortic dissection. D-Dimer less useful in the setting of recent surgery.  -CT-Angio with prelim report negative for PE. Follow up final report.  -Would rule out for ACS with Cardiac enzymes x 2 sets (initial hsTrop negative at 7)  -Pain control with Tylenol.    2. HTN - BP elevated 160/81  -Suspect BP elevated today in the setting of pain.  -Pain control as above  -Cont Olmesartan 20 mg po QD    Thank you for this interesting consult.     Oskar Calderon MD  Cardiology Attending  [facility]   Cell: [phone]  (Cardiology Nocturnist cell number available 7 pm - 7 am every night; available daytime week days for follow-up only; daytime weekends covered by general cardiology consult service)

## 2019-03-26 NOTE — PROGRESS NOTE ADULT - PROBLEM SELECTOR PLAN 1
Neuro: Continue PO pain medication  CV: Hemodynamically stable. Elevated BPs. C/o atypical chest pain  -S/p Cardiology consult for CP. Appreciated recommendations: pain likely MSK as it has a reproducible component.  -EKG WNL, Trop x 1 neg  -F/u 2nd Trop  -HTN: start olmesartan 20 mg this AM  -Hot packs and PO medication for pain.  Pulm: Saturating well on RA. Increase incentive spirometry.  -CTA preliminarily neg for PE, f/u final report  GI: Continue regular diet  :  x 1 removed, Herrera in place. Pt to have TOV this AM after breakfast  Heme: Continue Lovenox/Venodynes for DVT ppx. Increase OOB.    ID: Afebrile  Dispo: discharge planning    DANIELLE Quiñonez PGY2 Neuro: Continue PO pain medication  CV: Hemodynamically stable. Elevated BPs. C/o atypical chest pain  -S/p Cardiology consult for CP. Appreciated recommendations: pain likely MSK as it has a reproducible component.  -EKG WNL, Trop x 1 neg  -F/u 2nd Trop, CKMB, AM CBC  -HTN: start olmesartan 20 mg this AM  -Hot packs and PO medication for pain.  Pulm: Saturating well on RA. Increase incentive spirometry.  -CTA preliminarily neg for PE, f/u final report  GI: Continue regular diet  :  x 1 removed, Herrera in place. Pt to have TOV this AM after breakfast  Heme: Continue Lovenox/Venodynes for DVT ppx. Increase OOB.    ID: Afebrile  Dispo: discharge planning    DANIELLE Quiñonez PGY2

## 2019-03-26 NOTE — CHART NOTE - NSCHARTNOTEFT_GEN_A_CORE
GYN PA NOTE    At 10 am, pt evaluated at bedside for TOV. RN notified this PA that pt had episode of emesis earlier on the morning after having a few bites of breakfast. Pt denies nausea, abdominal pain. Denies passing flatus. At time of initial evaluation, pt abdomen soft and non-tender. Plan was to have oral hydration and some crackers for evaluation 2 hours later    At 12pm, pt reevaluated about 30 min after having crackers- pt had 1 episode of emesis. Denies nausea- states "it didn't sit right with me because it was stale".    ICU Vital Signs Last 24 Hrs  T(C): 36.9 (26 Mar 2019 09:15), Max: 37.3 (26 Mar 2019 04:59)  T(F): 98.5 (26 Mar 2019 09:15), Max: 99.1 (26 Mar 2019 04:59)  HR: 65 (26 Mar 2019 09:15) (56 - 81)  BP: 168/77 (26 Mar 2019 09:15) (133/- - 173/86)  BP(mean): 111 (25 Mar 2019 16:45) (72 - 123)  ABP: --  ABP(mean): --  RR: 18 (26 Mar 2019 09:15) (14 - 18)  SpO2: 97% (26 Mar 2019 09:15) (94% - 100%)      Gen: NAD  Abd: soft NDNT absent bowel sounds  : adequate urine output    Plan:  clears  zofran  attempt regular diet this evening  TOV held at this time    d/w Dr Lisandra Coronado, PAC

## 2019-03-26 NOTE — CHART NOTE - NSCHARTNOTEFT_GEN_A_CORE
AJAY TERI [id number]    R2 GYN Progress Note    POD#   HD#    Pt seen at bedside due to HTN.  Notified by RN that BP was 170s/100  Upon evaluation, pt appears uncomfortable.  States she has an upper back pain that is radiating to her left breast.  8/10 in severity.  Pt reports the pain has been present since yesterday. Denies nausea, vomiting, SOB. Pain does not radiate to arm. Pt able to demonstrate point tenderness on her back where the pain  arises.     Vital Signs   Pulse ox: 98% on room air  BP: 160s/90s at rest in bed  HR: 60-70    I&O's Summary    25 Mar 2019 07:01  -  26 Mar 2019 01:54  --------------------------------------------------------  IN: 445 mL / OUT: 500 mL / NET: -55 mL      Physical Exam:  General: appears uncomfortable   CV: RR, S1, S2, no M/R/G  Lungs: CTA b/l  Back: no abnormalities  Chest: no abnormalities   Abdomen: Soft, appropriately tender, mildly distended, +BS  : conklin in place  Ext: No pain or swelling     Labs:                        14.8   8.8   )-----------( 165      ( 25 Mar 2019 21:56 )             42.2   baso x      eos x      imm gran x      lymph x      mono x      poly x          A/P: 73y s/p TVH, TVT, uterosacral suspension, cystoscopy.    1. chest/back pain  - EKG performed and reviewed with cardiology fellow - overall normal EKG  - will trend troponin and CKMB  - IV tylenol, pepcid for possible GERD  - continuous pulse ox - low suspicion for PE    2. HTN  - hold IV anti hypertensives at this time due to ACS workup    Renetta Freitas PGY-2  d/w Dr Sethi AJAY TERI [id number]    R2 GYN Progress Note    POD#   HD#    Pt seen at bedside due to HTN.  Notified by RN that BP was 170s/100  Upon evaluation, pt appears uncomfortable.  States she has an upper back pain that is radiating to her left breast.  8/10 in severity.  Pt reports the pain has been present since yesterday. Denies nausea, vomiting, SOB. Pain does not radiate to arm. Pt able to demonstrate point tenderness on her back where the pain  arises.     Vital Signs   Pulse ox: 98% on room air  BP: 160s/90s at rest in bed  HR: 60-70    I&O's Summary    25 Mar 2019 07:01  -  26 Mar 2019 01:54  --------------------------------------------------------  IN: 445 mL / OUT: 500 mL / NET: -55 mL      Physical Exam:  General: appears uncomfortable   CV: RR, S1, S2, no M/R/G  Lungs: CTA b/l  Back: no abnormalities  Chest: no abnormalities   Abdomen: Soft, appropriately tender, mildly distended, +BS  : conklin in place  Ext: No pain or swelling     Labs:                        14.8   8.8   )-----------( 165      ( 25 Mar 2019 21:56 )             42.2   baso x      eos x      imm gran x      lymph x      mono x      poly x          A/P: 73y s/p TVH, TVT, uterosacral suspension, cystoscopy.    1. chest/back pain  - EKG performed and reviewed with cardiology fellow - overall normal EKG  - will trend troponin and CKMB  - IV tylenol, pepcid for possible GERD  - continuous pulse ox - low suspicion for PE    2. HTN  - hold IV anti hypertensives at this time due to ACS workup    Renetta Freitas PGY-2  d/w Dr Sethi        Chart update:   Patient has received IV Pepcid, tylenol and flexeril and reports that the pain is unchanged.  Still appears very uncomfortable.  1st set of cardiac enzymes negative.     Workup to r/o PE with CT angio chest ordered.  Pt should be on continuous pulse ox as workup is being completed.    Per conversations with nurse management, there are no beds available in hospital that can offer cont pulse ox, only option is to move to tele.  Per nurse management, they would like official cardiology consult in chart for recommendations on monitoring and monitoring during CTangio.    Cardiology consult placed.    Renetta Freitas PGY2 AJAY TERI [id number]    R2 GYN Progress Note    POD#1   HD#2    Pt seen at bedside due to HTN.  Notified by RN that BP was 170s/100  Upon evaluation, pt appears uncomfortable.  States she has an upper back pain that is radiating to her left breast.  8/10 in severity.  Pt reports the pain has been present since yesterday. Denies nausea, vomiting, SOB. Pain does not radiate to arm. Pt able to demonstrate point tenderness on her back where the pain  arises.     Vital Signs   Pulse ox: 98% on room air  BP: 160s/90s at rest in bed  HR: 60-70    I&O's Summary    25 Mar 2019 07:01  -  26 Mar 2019 01:54  --------------------------------------------------------  IN: 445 mL / OUT: 500 mL / NET: -55 mL      Physical Exam:  General: appears uncomfortable   CV: RR, S1, S2, no M/R/G  Lungs: CTA b/l  Back: no abnormalities  Chest: no abnormalities   Abdomen: Soft, appropriately tender, mildly distended, +BS  : conklin in place  Ext: No pain or swelling     Labs:                        14.8   8.8   )-----------( 165      ( 25 Mar 2019 21:56 )             42.2   baso x      eos x      imm gran x      lymph x      mono x      poly x          A/P: 73y s/p TVH, TVT, uterosacral suspension, cystoscopy.    1. chest/back pain  - EKG performed and reviewed with cardiology fellow - overall normal EKG  - will trend troponin and CKMB  - IV tylenol, pepcid for possible GERD  - continuous pulse ox - low suspicion for PE    2. HTN  - hold IV anti hypertensives at this time due to ACS workup    Renetta Freitas PGY-2  d/w Dr Sethi        Chart update:   Patient has received IV Pepcid, tylenol and flexeril and reports that the pain is unchanged.  Still appears very uncomfortable.  1st set of cardiac enzymes negative.     Workup to r/o PE with CT angio chest ordered.  Pt should be on continuous pulse ox as workup is being completed.    Per conversations with nurse management, there are no beds available in hospital that can offer cont pulse ox, only option is to move to tele.  Per nurse management, they would like official cardiology consult in chart for recommendations on monitoring and monitoring during CTangio.    Cardiology consult placed.    Renetta Freitas PGY2 AJAY TERI [id number]    R2 GYN Progress Note    POD#1   HD#2    Pt seen at bedside due to HTN.  Notified by RN that BP was 170s/100  Upon evaluation, pt appears uncomfortable.  States she has an upper back pain that is radiating to her left breast.  8/10 in severity.  Pt reports the pain has been present since yesterday. Denies nausea, vomiting, SOB. Pain does not radiate to arm. Pt able to demonstrate point tenderness on her back where the pain  arises.     Vital Signs   Pulse ox: 98% on room air  BP: 160s/90s at rest in bed  HR: 60-70    I&O's Summary    25 Mar 2019 07:01  -  26 Mar 2019 01:54  --------------------------------------------------------  IN: 445 mL / OUT: 500 mL / NET: -55 mL      Physical Exam:  General: appears uncomfortable   CV: RR, S1, S2, no M/R/G  Lungs: CTA b/l  Back: no abnormalities  Chest: no abnormalities   Abdomen: Soft, appropriately tender, mildly distended, +BS  : conklin in place  Ext: No pain or swelling     Labs:                        14.8   8.8   )-----------( 165      ( 25 Mar 2019 21:56 )             42.2   baso x      eos x      imm gran x      lymph x      mono x      poly x          A/P: 73y s/p TVH, TVT, uterosacral suspension, cystoscopy.    1. chest/back pain  - EKG performed and reviewed with cardiology fellow - overall normal EKG  - will trend troponin and CKMB  - IV tylenol, pepcid for possible GERD  - continuous pulse ox - low suspicion for PE    2. HTN  - hold IV anti hypertensives at this time due to ACS workup    Renetta Freitas PGY-2  d/w Dr Sethi        Chart update:   Patient has received IV Pepcid, tylenol and flexeril and reports that the pain is unchanged.  Still appears very uncomfortable.  1st set of cardiac enzymes negative.     Workup to r/o PE with CT angio chest ordered.  Pt should be on continuous pulse ox as workup is being completed.    Per conversations with nurse management, there are no beds available in hospital that can offer cont pulse ox, only option is to move to tele.  Per nurse management, they would like official cardiology consult in chart for recommendations on monitoring and monitoring during CTangio.    Cardiology consult placed.    Renetta Freitas PGY2      Agree with the above notes. Dr. Freitas discussed this patient with me overnight and we agreed on the plan for EKG f/b CT PE protocol if pain unchanged following medical management. Will have continuous pulse ox during work-up and consult cardiology.   JAYY Sethi, PGY5

## 2019-03-27 ENCOUNTER — TRANSCRIPTION ENCOUNTER (OUTPATIENT)
Age: 74
End: 2019-03-27

## 2019-03-27 DIAGNOSIS — Z98.890 OTHER SPECIFIED POSTPROCEDURAL STATES: ICD-10-CM

## 2019-03-27 DIAGNOSIS — I10 ESSENTIAL (PRIMARY) HYPERTENSION: ICD-10-CM

## 2019-03-27 LAB
ANION GAP SERPL CALC-SCNC: 16 MMOL/L — SIGNIFICANT CHANGE UP (ref 5–17)
ANION GAP SERPL CALC-SCNC: 19 MMOL/L — HIGH (ref 5–17)
BUN SERPL-MCNC: 14 MG/DL — SIGNIFICANT CHANGE UP (ref 7–23)
BUN SERPL-MCNC: 15 MG/DL — SIGNIFICANT CHANGE UP (ref 7–23)
CALCIUM SERPL-MCNC: 8.7 MG/DL — SIGNIFICANT CHANGE UP (ref 8.4–10.5)
CALCIUM SERPL-MCNC: 8.9 MG/DL — SIGNIFICANT CHANGE UP (ref 8.4–10.5)
CHLORIDE SERPL-SCNC: 96 MMOL/L — SIGNIFICANT CHANGE UP (ref 96–108)
CHLORIDE SERPL-SCNC: 98 MMOL/L — SIGNIFICANT CHANGE UP (ref 96–108)
CO2 SERPL-SCNC: 20 MMOL/L — LOW (ref 22–31)
CO2 SERPL-SCNC: 22 MMOL/L — SIGNIFICANT CHANGE UP (ref 22–31)
CREAT SERPL-MCNC: 0.65 MG/DL — SIGNIFICANT CHANGE UP (ref 0.5–1.3)
CREAT SERPL-MCNC: 0.69 MG/DL — SIGNIFICANT CHANGE UP (ref 0.5–1.3)
GLUCOSE SERPL-MCNC: 117 MG/DL — HIGH (ref 70–99)
GLUCOSE SERPL-MCNC: 152 MG/DL — HIGH (ref 70–99)
HCT VFR BLD CALC: 44.6 % — SIGNIFICANT CHANGE UP (ref 34.5–45)
HGB BLD-MCNC: 15.3 G/DL — SIGNIFICANT CHANGE UP (ref 11.5–15.5)
MCHC RBC-ENTMCNC: 30 PG — SIGNIFICANT CHANGE UP (ref 27–34)
MCHC RBC-ENTMCNC: 34.2 GM/DL — SIGNIFICANT CHANGE UP (ref 32–36)
MCV RBC AUTO: 87.9 FL — SIGNIFICANT CHANGE UP (ref 80–100)
PLATELET # BLD AUTO: 188 K/UL — SIGNIFICANT CHANGE UP (ref 150–400)
POTASSIUM SERPL-MCNC: 3.1 MMOL/L — LOW (ref 3.5–5.3)
POTASSIUM SERPL-MCNC: 3.3 MMOL/L — LOW (ref 3.5–5.3)
POTASSIUM SERPL-SCNC: 3.1 MMOL/L — LOW (ref 3.5–5.3)
POTASSIUM SERPL-SCNC: 3.3 MMOL/L — LOW (ref 3.5–5.3)
RBC # BLD: 5.08 M/UL — SIGNIFICANT CHANGE UP (ref 3.8–5.2)
RBC # FLD: 13.2 % — SIGNIFICANT CHANGE UP (ref 10.3–14.5)
SODIUM SERPL-SCNC: 135 MMOL/L — SIGNIFICANT CHANGE UP (ref 135–145)
SODIUM SERPL-SCNC: 136 MMOL/L — SIGNIFICANT CHANGE UP (ref 135–145)
WBC # BLD: 8.7 K/UL — SIGNIFICANT CHANGE UP (ref 3.8–10.5)
WBC # FLD AUTO: 8.7 K/UL — SIGNIFICANT CHANGE UP (ref 3.8–10.5)

## 2019-03-27 PROCEDURE — 71046 X-RAY EXAM CHEST 2 VIEWS: CPT | Mod: 26

## 2019-03-27 PROCEDURE — 99223 1ST HOSP IP/OBS HIGH 75: CPT

## 2019-03-27 RX ORDER — ONDANSETRON 8 MG/1
8 TABLET, FILM COATED ORAL ONCE
Qty: 0 | Refills: 0 | Status: DISCONTINUED | OUTPATIENT
Start: 2019-03-27 | End: 2019-03-29

## 2019-03-27 RX ORDER — AMLODIPINE BESYLATE 2.5 MG/1
5 TABLET ORAL ONCE
Qty: 0 | Refills: 0 | Status: COMPLETED | OUTPATIENT
Start: 2019-03-27 | End: 2019-03-27

## 2019-03-27 RX ORDER — LOSARTAN POTASSIUM 100 MG/1
100 TABLET, FILM COATED ORAL DAILY
Qty: 0 | Refills: 0 | Status: DISCONTINUED | OUTPATIENT
Start: 2019-03-27 | End: 2019-03-29

## 2019-03-27 RX ORDER — AMLODIPINE BESYLATE 2.5 MG/1
5 TABLET ORAL AT BEDTIME
Qty: 0 | Refills: 0 | Status: DISCONTINUED | OUTPATIENT
Start: 2019-03-27 | End: 2019-03-29

## 2019-03-27 RX ORDER — SODIUM CHLORIDE 9 MG/ML
500 INJECTION, SOLUTION INTRAVENOUS ONCE
Qty: 0 | Refills: 0 | Status: COMPLETED | OUTPATIENT
Start: 2019-03-27 | End: 2019-03-27

## 2019-03-27 RX ORDER — POLYETHYLENE GLYCOL 3350 17 G/17G
17 POWDER, FOR SOLUTION ORAL ONCE
Qty: 0 | Refills: 0 | Status: COMPLETED | OUTPATIENT
Start: 2019-03-27 | End: 2019-03-27

## 2019-03-27 RX ORDER — FAMOTIDINE 10 MG/ML
20 INJECTION INTRAVENOUS DAILY
Qty: 0 | Refills: 0 | Status: DISCONTINUED | OUTPATIENT
Start: 2019-03-27 | End: 2019-03-27

## 2019-03-27 RX ORDER — DEXTROSE MONOHYDRATE, SODIUM CHLORIDE, AND POTASSIUM CHLORIDE 50; .745; 4.5 G/1000ML; G/1000ML; G/1000ML
1000 INJECTION, SOLUTION INTRAVENOUS
Qty: 0 | Refills: 0 | Status: DISCONTINUED | OUTPATIENT
Start: 2019-03-27 | End: 2019-03-29

## 2019-03-27 RX ORDER — LOSARTAN POTASSIUM 100 MG/1
50 TABLET, FILM COATED ORAL ONCE
Qty: 0 | Refills: 0 | Status: COMPLETED | OUTPATIENT
Start: 2019-03-27 | End: 2019-03-27

## 2019-03-27 RX ORDER — POTASSIUM CHLORIDE 20 MEQ
10 PACKET (EA) ORAL
Qty: 0 | Refills: 0 | Status: DISCONTINUED | OUTPATIENT
Start: 2019-03-27 | End: 2019-03-27

## 2019-03-27 RX ORDER — SODIUM CHLORIDE 9 MG/ML
500 INJECTION, SOLUTION INTRAVENOUS ONCE
Qty: 0 | Refills: 0 | Status: DISCONTINUED | OUTPATIENT
Start: 2019-03-27 | End: 2019-03-27

## 2019-03-27 RX ORDER — POLYETHYLENE GLYCOL 3350 17 G/17G
17 POWDER, FOR SOLUTION ORAL ONCE
Qty: 0 | Refills: 0 | Status: DISCONTINUED | OUTPATIENT
Start: 2019-03-27 | End: 2019-03-27

## 2019-03-27 RX ORDER — DEXTROSE MONOHYDRATE, SODIUM CHLORIDE, AND POTASSIUM CHLORIDE 50; .745; 4.5 G/1000ML; G/1000ML; G/1000ML
1000 INJECTION, SOLUTION INTRAVENOUS
Qty: 0 | Refills: 0 | Status: DISCONTINUED | OUTPATIENT
Start: 2019-03-27 | End: 2019-03-27

## 2019-03-27 RX ORDER — FAMOTIDINE 10 MG/ML
20 INJECTION INTRAVENOUS DAILY
Qty: 0 | Refills: 0 | Status: DISCONTINUED | OUTPATIENT
Start: 2019-03-27 | End: 2019-03-29

## 2019-03-27 RX ADMIN — FAMOTIDINE 20 MILLIGRAM(S): 10 INJECTION INTRAVENOUS at 18:00

## 2019-03-27 RX ADMIN — SIMETHICONE 80 MILLIGRAM(S): 80 TABLET, CHEWABLE ORAL at 05:35

## 2019-03-27 RX ADMIN — ONDANSETRON 4 MILLIGRAM(S): 8 TABLET, FILM COATED ORAL at 00:45

## 2019-03-27 RX ADMIN — ENOXAPARIN SODIUM 40 MILLIGRAM(S): 100 INJECTION SUBCUTANEOUS at 22:56

## 2019-03-27 RX ADMIN — ONDANSETRON 4 MILLIGRAM(S): 8 TABLET, FILM COATED ORAL at 09:34

## 2019-03-27 RX ADMIN — POLYETHYLENE GLYCOL 3350 17 GRAM(S): 17 POWDER, FOR SOLUTION ORAL at 13:23

## 2019-03-27 RX ADMIN — POLYETHYLENE GLYCOL 3350 17 GRAM(S): 17 POWDER, FOR SOLUTION ORAL at 16:24

## 2019-03-27 RX ADMIN — LOSARTAN POTASSIUM 50 MILLIGRAM(S): 100 TABLET, FILM COATED ORAL at 05:36

## 2019-03-27 RX ADMIN — AMLODIPINE BESYLATE 5 MILLIGRAM(S): 2.5 TABLET ORAL at 20:51

## 2019-03-27 RX ADMIN — ONDANSETRON 4 MILLIGRAM(S): 8 TABLET, FILM COATED ORAL at 15:24

## 2019-03-27 RX ADMIN — Medication 12.5 MILLIGRAM(S): at 16:41

## 2019-03-27 RX ADMIN — AMLODIPINE BESYLATE 5 MILLIGRAM(S): 2.5 TABLET ORAL at 01:08

## 2019-03-27 RX ADMIN — LOSARTAN POTASSIUM 50 MILLIGRAM(S): 100 TABLET, FILM COATED ORAL at 22:52

## 2019-03-27 RX ADMIN — SODIUM CHLORIDE 500 MILLILITER(S): 9 INJECTION, SOLUTION INTRAVENOUS at 13:12

## 2019-03-27 NOTE — PROVIDER CONTACT NOTE (CHANGE IN STATUS NOTIFICATION) - ACTION/TREATMENT ORDERED:
Md aware of all BP and vitals recorded today, MD aware pt received cozaar this am as ordered daily, no change in meds or orders at this time

## 2019-03-27 NOTE — DISCHARGE NOTE PROVIDER - HOSPITAL COURSE
72 y/o s/p TVH, TVT, uterosacral suspension, cystoscopy. . Please see operative note for details. Hct 42.2->43.4. Postoperative course c/b chest pain on POD#1 with negative EKG, troponins, and CTA. Postoperative course further c/b persistent nausea, vomiting POD#2 with unremarkable Abd X-ray...         On the day of discharge the patient is afebrile and hemodynamically stable. She is ambulating without assistance, voiding spontaneously, and tolerating regular diet. He pain is well controlled on oral medication. She is cleared for discharge with instructions for appropriate follow up. 74 y/o s/p TVH, TVT, uterosacral suspension, cystoscopy. . Please see operative note for details. Hct 42.2->43.4. Postoperative course c/b chest pain on POD#1 with negative EKG, troponins, and CTA. Postoperative course further c/b persistent nausea, vomiting POD#2 with unremarkable Abd X-ray. Pt failed TOV on POD#2. Patients nausea improved and tolerating PO intake.         On the day of discharge the patient is afebrile and hemodynamically stable. She is ambulating without assistance and tolerating regular diet. Her pain is well controlled on oral medication. She is cleared for discharge with instructions for appropriate follow up. Patient to F/U in Dr. Michael's office on Monday 4/1/19 for repeat TOV. Pt to F/U w/ PCP  in a week for BP monitoring. 72 y/o s/p TVH, TVT, uterosacral suspension, cystoscopy. . Please see operative note for details. Hct 42.2->43.4. Postoperative course c/b chest pain on POD#1 with negative EKG, troponins, and CTA. Postoperative course further c/b persistent nausea, vomiting POD#2 with unremarkable Abd X-ray. Pt failed TOV on POD#2. Patients nausea improved on POD#3 and tolerating PO intake.         On the day of discharge the patient is afebrile and hemodynamically stable. She is ambulating without assistance and tolerating regular diet. Her pain is well controlled on oral medication. She is cleared for discharge with instructions for appropriate follow up. Patient to F/U in Dr. Michael's office on Monday 4/1/19 for repeat TOV. Pt to F/U w/ PCP  in a week for BP monitoring.

## 2019-03-27 NOTE — CONSULT NOTE ADULT - ASSESSMENT
Hospital Medicine Consultation-- NIGHT HOSPITALIST:   Essential HTN in this patient post-op for CARMEN.  Would attempt to clarify patient's prior BP medications from patient's office internist.    Would suggest discontinuation of IVF and the NSAID and continuation of patient's Norvasc 5 mg daily and titration of patient's Losartan to 100 mg daily as tolerated.   Would also consider formal echocardiogram and urinalysis.

## 2019-03-27 NOTE — CONSULT NOTE ADULT - SUBJECTIVE AND OBJECTIVE BOX
HOSPITAL MEDICINE CONSULTATION--Night Hospitalist:   Patient UNKNOWN to me previously, requested at this point by Dr. Michael to evaluate this 72 y/o F--patient with a history of essential HTN, lumbar stenosis with past fusion in 2012 and 2016, vaginal bulging s/P CARMEN 3/25/29, s/P evaluation by cardiology following resolved episode of chest pain yesterday with nondiagnostic EKG and cardiac HS troponin assays, with persistent mildly elevated BP.  Patient with nausea earlier but presently denies N/V.   NO HA, no focal weakness.   No chest pain/pressure.  NO dyspnoea.  NO tearing back pain.  NO tearing chest pain.  NO cough.  No fever, no chills, no rigors.  No abdominal pain, no red blood per rectum or melena.  No joint pain.  No weight loss or anorexia.  Remaining review of systems not contributory.  No sweats.    Family history with both parents with HTN, presumably patient with essential hypertension.  Patient reports that patient previously on an unspecified BP medication with "H".      No tobacco, rare ethanol, negative CAGE.    [employer]: HOSPITAL MEDICINE CONSULTATION--Night Hospitalist:   Patient UNKNOWN to me previously, requested at this point by Dr. Michael to evaluate this 74 y/o F--patient with a history of essential HTN, lumbar stenosis with past fusion in 2012 and 2016, vaginal bulging s/P CARMEN 3/25/29, s/P evaluation by cardiology following resolved episode of chest pain yesterday with nondiagnostic EKG and cardiac HS troponin assays, with persistent mildly elevated BP.  Patient with nausea earlier but presently denies N/V.   NO HA, no focal weakness.   No chest pain/pressure.  NO dyspnoea.  NO tearing back pain.  NO tearing chest pain.  NO cough.  No fever, no chills, no rigors.  No abdominal pain, no red blood per rectum or melena.  No joint pain.  No weight loss or anorexia.  Remaining review of systems not contributory.  No sweats.    Family history with both parents with HTN, presumably patient with essential hypertension.  Patient reports that patient previously on an unspecified BP medication with "H".      No tobacco, rare ethanol, negative CAGE.    [employer]:  Lovenox 40 mg SQ daily  Lactated Ringers and D5 1/2NS at 125  Pepcid 20  Zofran  Phenergan  Toradol  Mylicon  Losartan 50 mg daily  Norvasc 5 mg PO x 1 3/27/19  Hydralazine 5 mg IVP x 1 on 2/25/19.    Physical exam with an elderly obese, chronically ill appearing F in no acute distress.  Afebrile.  HR  86.  BP  144//90  96% on RA.  HEENT PERRL< EOMI  Neck supple  Chest clear  Cor s1 s2  Declined breast exam  Abdomen soft nontender, no bruits noted. Normal bowel sounds  Ext with no oedema.  Skin dry  Neurologic exam AXOx3.  Speech fluent.  Cognition intact.  UE/LE 5/5.  Observed gait without ataxia.    Data reviewed with WBC 8.7.  Hgb 15.3.  Platelets of 188K>  K+ 3.3.   Random glucose of 117.  Cr 0.6.  HS troponin 7>>9.  CTT angiogram on 3/26/19 with NO PE>    EKG tracing reviewed with NSR at 74 with nonspecific ST changes.

## 2019-03-27 NOTE — PROGRESS NOTE ADULT - PROBLEM SELECTOR PLAN 1
[facility]: elevated BPs noted overnight. Patient given 5mg Amlodipine overnight in addition to her home Losartan 50mg dose. Appreciate cardiology recommendations for further med adjustment. F/u AM CBC, BMP.   Pulm: no acute issues, incentive spirometry use encouraged  GI: Phenergan suppository for persistent nausea. Advance diet as tolerated, f/u final upright Abd X-ray  : adequate UOP, TOV on day of discharge  Heme: Lovenox, venodynes for DVT Prophylaxis   Neuro: Analgesia PRN  Dispo: continue current management

## 2019-03-27 NOTE — DISCHARGE NOTE PROVIDER - NSDCCPTREATMENT_GEN_ALL_CORE_FT
PRINCIPAL PROCEDURE  Procedure: Total vaginal hysterectomy with suspension of uterosacral ligament, combined anteroposterior colporrhaphy, and cystoscopy  Findings and Treatment:

## 2019-03-27 NOTE — CHART NOTE - NSCHARTNOTEFT_GEN_A_CORE
Progress Note    72 yo POD#2 s/p TVH, TVT, uterosacral suspension, cystoscopy. Postop course c/b CP with neg work-up, now resolved. Patient evaluated at bedside for persistent nausea since her surgery. Patient continues to report nausea throughout the day, but no emesis. She would like to try clears to see if she is able to tolate PO. Pain is well controlled. Herrera in situ after failed TOV. Patient is starting to ambulate more in the room. She is passing some flatus, but "not much." Denies CP, SOB, fevers, and chills.    Vital Signs Last 24 Hours  T(C): 37 (03-27-19 @ 16:23), Max: 37.5 (03-26-19 @ 17:00)  HR: 86 (03-27-19 @ 16:23) (80 - 101)  BP: 168/90 (03-27-19 @ 16:23) (144/85 - 179/111)  RR: 18 (03-27-19 @ 16:23) (18 - 18)  SpO2: 96% (03-27-19 @ 16:23) (95% - 99%)    I&O's Summary    26 Mar 2019 07:01  -  27 Mar 2019 07:00  --------------------------------------------------------  IN: 900 mL / OUT: 1400 mL / NET: -500 mL    27 Mar 2019 07:01  -  27 Mar 2019 16:31  --------------------------------------------------------  IN: 0 mL / OUT: 550 mL / NET: -550 mL        Physical Exam:  General: NAD  CV: NR, RRR   Lungs: CTAB  Abdomen: Soft, non-tender, non-distended, +BS  Ext: No pain or swelling      Labs:             15.3   8.7   )-----------( 188      ( 03-27 @ 11:20 )             44.6                15.1   10.3  )-----------( 182      ( 03-26 @ 16:35 )             43.4                14.8   8.8   )-----------( 165      ( 03-25 @ 21:56 )             42.2         MEDICATIONS  (STANDING):  cefoTEtan  IVPB 2 Gram(s) IV Intermittent once  dextrose 5% + sodium chloride 0.45% with potassium chloride 20 mEq/L 1000 milliLiter(s) (125 mL/Hr) IV Continuous <Continuous>  enoxaparin Injectable 40 milliGRAM(s) SubCutaneous every 24 hours  famotidine Injectable 20 milliGRAM(s) IV Push once  lactated ringers. 1000 milliLiter(s) (75 mL/Hr) IV Continuous <Continuous>  losartan 50 milliGRAM(s) Oral daily  ondansetron Injectable 8 milliGRAM(s) IV Push once  simethicone 80 milliGRAM(s) Chew two times a day    MEDICATIONS  (PRN):  ketorolac   Injectable 30 milliGRAM(s) IV Push every 6 hours PRN Moderate Pain (4 - 6)  ondansetron Injectable 4 milliGRAM(s) IV Push every 6 hours PRN Nausea and/or Vomiting  promethazine Suppository 12.5 milliGRAM(s) Rectal every 6 hours PRN nausea/vomiting    A/P: 72 yo POD#2 s/p TVH, TVT, uterosacral suspension, cystoscopy with persistent post-op nausea. Non-distended abdomen, no repeat emesis. No obstructive pattern on Abd X-ray.   - Advance diet to clears and further as tolerated   - Phenergan suppository PRN   - Mild hypokalemia noted, repeat BMP 6PM if plans for discharge later in the evening, or AM if patient stays overnight.     Judah To PGY1  D/w Dr. Sethi Progress Note    74 yo POD#2 s/p TVH, TVT, uterosacral suspension, cystoscopy. Postop course c/b CP with neg work-up, now resolved. Patient evaluated at bedside for persistent nausea since her surgery. Patient continues to report nausea throughout the day, but no emesis. She would like to try clears to see if she is able to tolate PO. Pain is well controlled. Herrera in situ after failed TOV. Patient is starting to ambulate more in the room. She is passing some flatus, but "not much." Denies CP, SOB, fevers, and chills.    Vital Signs Last 24 Hours  T(C): 37 (03-27-19 @ 16:23), Max: 37.5 (03-26-19 @ 17:00)  HR: 86 (03-27-19 @ 16:23) (80 - 101)  BP: 168/90 (03-27-19 @ 16:23) (144/85 - 179/111)  RR: 18 (03-27-19 @ 16:23) (18 - 18)  SpO2: 96% (03-27-19 @ 16:23) (95% - 99%)      Physical Exam:  General: NAD  CV: NR, RRR   Lungs: CTAB  Abdomen: Soft, non-tender, non-distended, +BS  Ext: No pain or swelling      Labs:             15.3   8.7   )-----------( 188      ( 03-27 @ 11:20 )             44.6                15.1   10.3  )-----------( 182      ( 03-26 @ 16:35 )             43.4                14.8   8.8   )-----------( 165      ( 03-25 @ 21:56 )             42.2         A/P: 74 yo POD#2 s/p TVH, TVT, uterosacral suspension, cystoscopy with persistent post-op nausea. Non-distended abdomen, no repeat emesis. No obstructive pattern on Abd X-ray.   - Advance diet to clears and further as tolerated   - Phenergan suppository PRN   - Mild hypokalemia noted, repeat BMP 6PM if plans for discharge later in the evening, or AM if patient stays overnight.   - Patient to go home with Herrera, and follow-up in clinic on Friday for repeat TOV     Esra Yousuf PGY1  D/w Dr. Sethi Progress Note    72 yo POD#2 s/p TVH, TVT, uterosacral suspension, cystoscopy. Postop course c/b CP with neg work-up, now resolved. Patient evaluated at bedside for persistent nausea since her surgery. Patient continues to report nausea throughout the day, but no emesis. She would like to try clears to see if she is able to tolate PO. Pain is well controlled. Herrera in situ after failed TOV. Patient is starting to ambulate more in the room. She is passing some flatus, but "not much." Denies CP, SOB, fevers, and chills.    Vital Signs Last 24 Hours  T(C): 37 (03-27-19 @ 16:23), Max: 37.5 (03-26-19 @ 17:00)  HR: 86 (03-27-19 @ 16:23) (80 - 101)  BP: 168/90 (03-27-19 @ 16:23) (144/85 - 179/111)  RR: 18 (03-27-19 @ 16:23) (18 - 18)  SpO2: 96% (03-27-19 @ 16:23) (95% - 99%)      Physical Exam:  General: NAD  CV: NR, RRR   Lungs: CTAB  Abdomen: Soft, non-tender, non-distended, +BS  Ext: No pain or swelling      Labs:             15.3   8.7   )-----------( 188      ( 03-27 @ 11:20 )             44.6                15.1   10.3  )-----------( 182      ( 03-26 @ 16:35 )             43.4                14.8   8.8   )-----------( 165      ( 03-25 @ 21:56 )             42.2         A/P: 72 yo POD#2 s/p TVH, TVT, uterosacral suspension, cystoscopy with persistent post-op nausea. Non-distended abdomen, no repeat emesis. No obstructive pattern on Abd X-ray.   - Advance diet to clears and further as tolerated   - Phenergan suppository PRN   - Mild hypokalemia noted, repeat BMP 6PM if plans for discharge later in the evening, or AM if patient stays overnight.   - C/w D5+NS 0.45% + KCl 20  - Patient to go home with Herrera, and follow-up in clinic on Friday for repeat TOV     Esra Yousuf PGY1  D/w Dr. Sethi

## 2019-03-27 NOTE — PROVIDER CONTACT NOTE (OTHER) - ACTION/TREATMENT ORDERED:
MD Peace notified, will give 50mg Cozaar now as dose was increased today to 100mg and patient has only received 50mg today. Will repeat BP.

## 2019-03-27 NOTE — PROGRESS NOTE ADULT - SUBJECTIVE AND OBJECTIVE BOX
HD#3          POD#2     Patient seen and examined at bedside. Patient reports ongoing nausea, vomiting, last episode around 3am. Pain improving. Patient is ambulating, passing some flatus, Herrera in situ. Denies CP, SOB, fevers, and chills.    Vital Signs Last 24 Hours  T(C): 36.7 (03-27-19 @ 05:12), Max: 37.7 (03-26-19 @ 13:45)  HR: 90 (03-27-19 @ 05:12) (65 - 98)  BP: 174/89 (03-27-19 @ 05:12) (153/83 - 179/111)  RR: 18 (03-27-19 @ 05:12) (18 - 18)  SpO2: 96% (03-27-19 @ 05:12) (96% - 99%)    I&O's Summary    25 Mar 2019 07:01  -  26 Mar 2019 07:00  --------------------------------------------------------  IN: 445 mL / OUT: 1925 mL / NET: -1480 mL    26 Mar 2019 07:01  -  27 Mar 2019 06:57  --------------------------------------------------------  IN: 900 mL / OUT: 1400 mL / NET: -500 mL        Physical Exam:  General: NAD  CV: NR, RRR   Lungs: CTAB  Abdomen: Soft, non-tender, non-distended, +BS  Ext: No pain or swelling    Labs:             15.1   10.3  )-----------( 182      ( 03-26 @ 16:35 )             43.4                14.8   8.8   )-----------( 165      ( 03-25 @ 21:56 )             42.2         MEDICATIONS  (STANDING):  cefoTEtan  IVPB 2 Gram(s) IV Intermittent once  enoxaparin Injectable 40 milliGRAM(s) SubCutaneous every 24 hours  famotidine Injectable 20 milliGRAM(s) IV Push once  lactated ringers. 1000 milliLiter(s) (75 mL/Hr) IV Continuous <Continuous>  losartan 50 milliGRAM(s) Oral daily  ondansetron Injectable 8 milliGRAM(s) IV Push once  simethicone 80 milliGRAM(s) Chew two times a day    MEDICATIONS  (PRN):  ketorolac   Injectable 30 milliGRAM(s) IV Push every 6 hours PRN Moderate Pain (4 - 6)  ondansetron Injectable 4 milliGRAM(s) IV Push every 6 hours PRN Nausea and/or Vomiting  promethazine Suppository 12.5 milliGRAM(s) Rectal every 6 hours PRN nausea/vomiting HD#3          POD#2     Patient seen and examined at bedside. Patient reports ongoing nausea, vomiting, last episode around 3am. Pain improving. Patient is ambulating, passing some flatus, Herrera in situ. Intermittent chest pain for the past 2 years is currently resolved. Denies CP, SOB, fevers, and chills.    Vital Signs Last 24 Hours  T(C): 36.7 (03-27-19 @ 05:12), Max: 37.7 (03-26-19 @ 13:45)  HR: 90 (03-27-19 @ 05:12) (65 - 98)  BP: 174/89 (03-27-19 @ 05:12) (153/83 - 179/111)  RR: 18 (03-27-19 @ 05:12) (18 - 18)  SpO2: 96% (03-27-19 @ 05:12) (96% - 99%)    I&O's Summary    25 Mar 2019 07:01  -  26 Mar 2019 07:00  --------------------------------------------------------  IN: 445 mL / OUT: 1925 mL / NET: -1480 mL    26 Mar 2019 07:01  -  27 Mar 2019 06:57  --------------------------------------------------------  IN: 900 mL / OUT: 1400 mL / NET: -500 mL        Physical Exam:  General: NAD  CV: NR, RRR   Lungs: CTAB  Abdomen: Soft, non-tender, non-distended, +BS  Ext: No pain or swelling    Labs:             15.1   10.3  )-----------( 182      ( 03-26 @ 16:35 )             43.4                14.8   8.8   )-----------( 165      ( 03-25 @ 21:56 )             42.2         MEDICATIONS  (STANDING):  cefoTEtan  IVPB 2 Gram(s) IV Intermittent once  enoxaparin Injectable 40 milliGRAM(s) SubCutaneous every 24 hours  famotidine Injectable 20 milliGRAM(s) IV Push once  lactated ringers. 1000 milliLiter(s) (75 mL/Hr) IV Continuous <Continuous>  losartan 50 milliGRAM(s) Oral daily  ondansetron Injectable 8 milliGRAM(s) IV Push once  simethicone 80 milliGRAM(s) Chew two times a day    MEDICATIONS  (PRN):  ketorolac   Injectable 30 milliGRAM(s) IV Push every 6 hours PRN Moderate Pain (4 - 6)  ondansetron Injectable 4 milliGRAM(s) IV Push every 6 hours PRN Nausea and/or Vomiting  promethazine Suppository 12.5 milliGRAM(s) Rectal every 6 hours PRN nausea/vomiting

## 2019-03-27 NOTE — DISCHARGE NOTE PROVIDER - CARE PROVIDER_API CALL
Ayden Michael (MD)  Female [facility]; Obstetrics and Gynecology  [address]  [place] [address]  Phone: [phone]  Fax: [phone]  Follow Up Time:

## 2019-03-27 NOTE — CHART NOTE - NSCHARTNOTEFT_GEN_A_CORE
TOV note    Active trial of void performed at bedside.   300cc NS instilled into the bladder by gravity.  Herrera d/c'd and pt assisted to bathroom.  Pt voided 75 cc (allowed 25min to void)    New Herrera catheter inserted, 250cc urine drained.    Judah To PGY1 TOV note    Active trial of void performed at bedside.   300cc NS instilled into the bladder by gravity.  Herrera d/c'd and pt assisted to bathroom.  Pt voided 75 cc (allowed 25min to void)    New Herrera catheter inserted, 250cc urine drained.  Patient to go home with Herrera and make an appointment for Friday (3/29) for repeat TOV in the office.     Judah To PGY1

## 2019-03-27 NOTE — CONSULT NOTE ADULT - ATTENDING COMMENTS
Hospital Medicine Consultation--NIGHT HOSPITALIST:  Patient aware of recommendations and agree with recommendations as above.  Above reviewed with the Gynecology Team # [id number].    Thank you for the courtesy of this referral.   Our Consultation Service will follow with you.    Neal Avitia MD  [phone]

## 2019-03-27 NOTE — PROGRESS NOTE ADULT - ASSESSMENT
74 yo POD#2 s/p TVH, TVT, uterosacral suspension, cystoscopy. Postop course c/b CP with neg EKG, troponins, and CTA, Patient now with persistent nausea, vomiting postoperatively. 72 yo POD#2 s/p TVH, TVT, uterosacral suspension, cystoscopy. Postop course c/b CP with neg EKG, troponins, and CTA, Patient now with persistent nausea, vomiting postoperatively.     Agree with the above note/plan.  1) Post-op care  -not yet tolerating POs or ambulating   -Encourage IS and ambulation/sitting in chair today  -AM CBC and BMP to be drawn  -Herrera to remain in - UOP adequate   -Packing removed POD1  2) HTN  -Home Losartan restarted POD1  -Discussed persistent HTN with cardiology - recommended Amlodipine 5mg - will continue to monitor  3) Chest pain  -Likely musculoskeletal  -S/p negative workup as above  -Currently resolved - has been intermittent x2 years  4) Nausea/emesis  -Phenergan WI and Zofran IV prn, Pepcid  -XR yesterday evening - prelim read showed no emergent findings  -NPO for bowel rest  5) PPX  -SCDs, Lovenox  Dispo: Continue to monitor in house. D/c pending clinical status.   JAYY Sethi, PGY5

## 2019-03-27 NOTE — CONSULT NOTE ADULT - PROBLEM SELECTOR RECOMMENDATION 9
--Suggest discontinuation of IVF and NSAID.  --Would consider continuation of patient's Norvasc 5 mg daily and titration of patient's Losartan to 75 mg to 100 mg daily.  --Transthoracic echo if not done recently.

## 2019-03-28 ENCOUNTER — RESULT REVIEW (OUTPATIENT)
Age: 74
End: 2019-03-28

## 2019-03-28 LAB
ANION GAP SERPL CALC-SCNC: 13 MMOL/L — SIGNIFICANT CHANGE UP (ref 5–17)
BUN SERPL-MCNC: 13 MG/DL — SIGNIFICANT CHANGE UP (ref 7–23)
CALCIUM SERPL-MCNC: 8.6 MG/DL — SIGNIFICANT CHANGE UP (ref 8.4–10.5)
CHLORIDE SERPL-SCNC: 101 MMOL/L — SIGNIFICANT CHANGE UP (ref 96–108)
CO2 SERPL-SCNC: 24 MMOL/L — SIGNIFICANT CHANGE UP (ref 22–31)
CREAT SERPL-MCNC: 0.76 MG/DL — SIGNIFICANT CHANGE UP (ref 0.5–1.3)
GLUCOSE SERPL-MCNC: 133 MG/DL — HIGH (ref 70–99)
POTASSIUM SERPL-MCNC: 3.6 MMOL/L — SIGNIFICANT CHANGE UP (ref 3.5–5.3)
POTASSIUM SERPL-SCNC: 3.6 MMOL/L — SIGNIFICANT CHANGE UP (ref 3.5–5.3)
SODIUM SERPL-SCNC: 138 MMOL/L — SIGNIFICANT CHANGE UP (ref 135–145)
SURGICAL PATHOLOGY STUDY: SIGNIFICANT CHANGE UP

## 2019-03-28 PROCEDURE — 99233 SBSQ HOSP IP/OBS HIGH 50: CPT

## 2019-03-28 RX ORDER — POTASSIUM CHLORIDE 20 MEQ
20 PACKET (EA) ORAL ONCE
Qty: 0 | Refills: 0 | Status: COMPLETED | OUTPATIENT
Start: 2019-03-28 | End: 2019-03-28

## 2019-03-28 RX ADMIN — LOSARTAN POTASSIUM 100 MILLIGRAM(S): 100 TABLET, FILM COATED ORAL at 05:48

## 2019-03-28 RX ADMIN — SIMETHICONE 80 MILLIGRAM(S): 80 TABLET, CHEWABLE ORAL at 04:00

## 2019-03-28 RX ADMIN — AMLODIPINE BESYLATE 5 MILLIGRAM(S): 2.5 TABLET ORAL at 20:51

## 2019-03-28 RX ADMIN — DEXTROSE MONOHYDRATE, SODIUM CHLORIDE, AND POTASSIUM CHLORIDE 75 MILLILITER(S): 50; .745; 4.5 INJECTION, SOLUTION INTRAVENOUS at 18:01

## 2019-03-28 RX ADMIN — ENOXAPARIN SODIUM 40 MILLIGRAM(S): 100 INJECTION SUBCUTANEOUS at 23:26

## 2019-03-28 RX ADMIN — FAMOTIDINE 20 MILLIGRAM(S): 10 INJECTION INTRAVENOUS at 13:04

## 2019-03-28 RX ADMIN — Medication 20 MILLIEQUIVALENT(S): at 00:39

## 2019-03-28 RX ADMIN — DEXTROSE MONOHYDRATE, SODIUM CHLORIDE, AND POTASSIUM CHLORIDE 75 MILLILITER(S): 50; .745; 4.5 INJECTION, SOLUTION INTRAVENOUS at 08:46

## 2019-03-28 RX ADMIN — ONDANSETRON 4 MILLIGRAM(S): 8 TABLET, FILM COATED ORAL at 08:46

## 2019-03-28 NOTE — PROGRESS NOTE ADULT - PROBLEM SELECTOR PLAN 1
- BPs in range given pt's age, though elevated to SBPs in the 180s during the day  - continue home regimen  - please ensure adequate analgesia (avoid overuse of NSAIDs) as could be etiology of BP spikes - BPs in range given pt's age, though elevated to SBPs in the 160-170s as of this afternoon  - continue current antihypertensive regimen w/ close monitoring  - please ensure adequate analgesia (avoid overuse of NSAIDs) as could be etiology of BP spikes

## 2019-03-28 NOTE — PROVIDER CONTACT NOTE (OTHER) - SITUATION
Patient BP at 1252pm is 163/91 hr 89.  Patient ate Jello, but refuses to order lunch.  RN requested son at bedside to order meal for his mother. Son verbalized understanding.

## 2019-03-28 NOTE — PROVIDER CONTACT NOTE (OTHER) - ACTION/TREATMENT ORDERED:
Provider acknowledged.  No change to orders at this time.  Continue to monitor patient. Provider acknowledged.  No change to orders at this time.  Continue to monitor patient.  Provider will notify team to order PT evaluation.

## 2019-03-28 NOTE — PROGRESS NOTE ADULT - PROBLEM SELECTOR PLAN 2
- POD 3 s/p TVH  - analgesia as above  - advance diet as tolerate w/ antiemetic PRN (Zofran, monitor QTc)

## 2019-03-28 NOTE — PROVIDER CONTACT NOTE (OTHER) - DATE AND TIME:
26-Mar-2019 14:30
26-Mar-2019 21:00
27-Mar-2019 00:49
27-Mar-2019 05:22
27-Mar-2019 22:23
28-Mar-2019 09:20
28-Mar-2019 13:25
28-Mar-2019 18:10
Clothing

## 2019-03-28 NOTE — PROGRESS NOTE ADULT - SUBJECTIVE AND OBJECTIVE BOX
Patient is a 73y old  Female who presents with a chief complaint of S/P CARMEN 3/25/19 (27 Mar 2019 19:11)    SUBJECTIVE / OVERNIGHT EVENTS: Patient seen and examined. No acute overnight events, patient continues to complain of abdominal soreness and pain.    OBJECTIVE:  Vital Signs Last 24 Hrs  T(F): 98.4 (28 Mar 2019 12:52), Max: 99 (27 Mar 2019 20:06)  HR: 89 (28 Mar 2019 12:52) (77 - 91)  BP: 163/91 (28 Mar 2019 12:52) (132/83 - 180/90)  RR: 18 (28 Mar 2019 12:52) (18 - 18)  SpO2: 97% (28 Mar 2019 12:52) (96% - 98%)    I&O's Summary  27 Mar 2019 07:01  -  28 Mar 2019 07:00  --------------------------------------------------------  IN: 600 mL / OUT: 2050 mL / NET: -1450 mL    28 Mar 2019 07:01  -  28 Mar 2019 15:07  --------------------------------------------------------  IN: 0 mL / OUT: 600 mL / NET: -600 mL    Physical Examination:    Labs:  03-28  138  |  101  |  13  ----------------------------<  133<H>  3.6   |  24  |  0.76    Ca    8.6      28 Mar 2019 06:49    MEDICATIONS  (STANDING):  amLODIPine   Tablet 5 milliGRAM(s) Oral at bedtime  cefoTEtan  IVPB 2 Gram(s) IV Intermittent once  dextrose 5% + sodium chloride 0.45% with potassium chloride 20 mEq/L 1000 milliLiter(s) (75 mL/Hr) IV Continuous <Continuous>  enoxaparin Injectable 40 milliGRAM(s) SubCutaneous every 24 hours  famotidine Injectable 20 milliGRAM(s) IV Push daily  losartan 100 milliGRAM(s) Oral daily  ondansetron Injectable 8 milliGRAM(s) IV Push once  simethicone 80 milliGRAM(s) Chew two times a day    MEDICATIONS  (PRN):  ketorolac   Injectable 30 milliGRAM(s) IV Push every 6 hours PRN Moderate Pain (4 - 6)  ondansetron Injectable 4 milliGRAM(s) IV Push every 6 hours PRN Nausea and/or Vomiting Patient is a 73y old  Female who presents with a chief complaint of S/P CARMEN 3/25/19 (27 Mar 2019 19:11)    SUBJECTIVE / OVERNIGHT EVENTS: Patient seen and examined. No acute overnight events, patient continues to complain of abdominal soreness and pain.    OBJECTIVE:  Vital Signs Last 24 Hrs  T(F): 98.4 (28 Mar 2019 12:52), Max: 99 (27 Mar 2019 20:06)  HR: 89 (28 Mar 2019 12:52) (77 - 91)  BP: 163/91 (28 Mar 2019 12:52) (132/83 - 180/90)  RR: 18 (28 Mar 2019 12:52) (18 - 18)  SpO2: 97% (28 Mar 2019 12:52) (96% - 98%)    I&O's Summary  27 Mar 2019 07:01  -  28 Mar 2019 07:00  --------------------------------------------------------  IN: 600 mL / OUT: 2050 mL / NET: -1450 mL    28 Mar 2019 07:01  -  28 Mar 2019 15:07  --------------------------------------------------------  IN: 0 mL / OUT: 600 mL / NET: -600 mL    Physical Examination:  GEN: elderly woman, laying in bed in mild distress d/t soreness/pain  PSYCH: A&Ox3, mood and affect appear appropriate   RESPI: no accessory muscle use, B/L air entry, CTAB   CARDIO: regular rate/rhythm, no LE edema B/L  ABD: soft, NT, ND, +BS  EXT: patient able to move all extremities spontaneously  VASC: peripheral pulses palpated    Labs:  03-28  138  |  101  |  13  ----------------------------<  133<H>  3.6   |  24  |  0.76    Ca    8.6      28 Mar 2019 06:49    MEDICATIONS  (STANDING):  amLODIPine   Tablet 5 milliGRAM(s) Oral at bedtime  cefoTEtan  IVPB 2 Gram(s) IV Intermittent once  dextrose 5% + sodium chloride 0.45% with potassium chloride 20 mEq/L 1000 milliLiter(s) (75 mL/Hr) IV Continuous <Continuous>  enoxaparin Injectable 40 milliGRAM(s) SubCutaneous every 24 hours  famotidine Injectable 20 milliGRAM(s) IV Push daily  losartan 100 milliGRAM(s) Oral daily  ondansetron Injectable 8 milliGRAM(s) IV Push once  simethicone 80 milliGRAM(s) Chew two times a day    MEDICATIONS  (PRN):  ketorolac   Injectable 30 milliGRAM(s) IV Push every 6 hours PRN Moderate Pain (4 - 6)  ondansetron Injectable 4 milliGRAM(s) IV Push every 6 hours PRN Nausea and/or Vomiting Patient is a 73y old  Female who presents with a chief complaint of S/P CARMEN 3/25/19 (27 Mar 2019 19:11)    SUBJECTIVE / OVERNIGHT EVENTS: Patient seen and examined. No acute overnight events, patient continues to complain of abdominal soreness and pain and nausea and "spitting up," w/ jello intake. Family at bedside concerned about patient's pain levels as they note she appears to be in severe distress.    OBJECTIVE:  Vital Signs Last 24 Hrs  T(F): 98.4 (28 Mar 2019 12:52), Max: 99 (27 Mar 2019 20:06)  HR: 89 (28 Mar 2019 12:52) (77 - 91)  BP: 163/91 (28 Mar 2019 12:52) (132/83 - 180/90)  RR: 18 (28 Mar 2019 12:52) (18 - 18)  SpO2: 97% (28 Mar 2019 12:52) (96% - 98%)    I&O's Summary  27 Mar 2019 07:01  -  28 Mar 2019 07:00  --------------------------------------------------------  IN: 600 mL / OUT: 2050 mL / NET: -1450 mL    28 Mar 2019 07:01  -  28 Mar 2019 15:07  --------------------------------------------------------  IN: 0 mL / OUT: 600 mL / NET: -600 mL    Physical Examination:  GEN: elderly woman, laying in bed in mild distress d/t soreness/pain  PSYCH: A&Ox3, mood and affect appear appropriate   RESPI: no accessory muscle use, B/L air entry, CTAB   CARDIO: regular rate/rhythm, no LE edema B/L  ABD: soft, pt refusing palpation, ND, hypoactive BS  EXT: patient able to move all extremities spontaneously  VASC: peripheral pulses palpated    Labs:  03-28  138  |  101  |  13  ----------------------------<  133<H>  3.6   |  24  |  0.76    Ca    8.6      28 Mar 2019 06:49    MEDICATIONS  (STANDING):  amLODIPine   Tablet 5 milliGRAM(s) Oral at bedtime  cefoTEtan  IVPB 2 Gram(s) IV Intermittent once  dextrose 5% + sodium chloride 0.45% with potassium chloride 20 mEq/L 1000 milliLiter(s) (75 mL/Hr) IV Continuous <Continuous>  enoxaparin Injectable 40 milliGRAM(s) SubCutaneous every 24 hours  famotidine Injectable 20 milliGRAM(s) IV Push daily  losartan 100 milliGRAM(s) Oral daily  ondansetron Injectable 8 milliGRAM(s) IV Push once  simethicone 80 milliGRAM(s) Chew two times a day    MEDICATIONS  (PRN):  ketorolac   Injectable 30 milliGRAM(s) IV Push every 6 hours PRN Moderate Pain (4 - 6)  ondansetron Injectable 4 milliGRAM(s) IV Push every 6 hours PRN Nausea and/or Vomiting

## 2019-03-28 NOTE — PROVIDER CONTACT NOTE (OTHER) - ACTION/TREATMENT ORDERED:
Provider acknowledged BP and aware of patient's BP medications. No change to orders at this time.   Continue to monitor patient.

## 2019-03-28 NOTE — PROVIDER CONTACT NOTE (OTHER) - ACTION/TREATMENT ORDERED:
Provider acknowledged.  No change to orders at this time.  Continue to monitor patient.  Continue to encourage patient to eat.

## 2019-03-28 NOTE — PROVIDER CONTACT NOTE (OTHER) - BACKGROUND
Vaginal hysterectomy  sling
72 YO F with Hx of cHTN s/p vag hyst with sling. BP elevated in post op period, Cardiac and Medicine services consulted, EKG WNL. Norvasc 5mg added and Cozaar increased from 50mg to 100mg.
Patient has history of chronic HTN and has orders for Losartan QD and Norvasc QHS.
Total vaginal hysterectomy. History of Chronic HTN.
s/p Total vaginal hysterectomy, history of Chronic HTN.
s/p vaginal hysterectomy sling

## 2019-03-28 NOTE — PROGRESS NOTE ADULT - SUBJECTIVE AND OBJECTIVE BOX
UROGYN Progress Note    POD#3    Patient seen and examined at bedside, no acute overnight events. Reports last episode of emesis was yesterday afternoon. She has been tolerating clear liquids without nausea/emesis. No acute complaints, pain well controlled. Patient is ambulating, passing flatus. Failed TOV yesterday and has a conklin catheter in place. Denies CP, SOB, N/V, fevers, and chills.    Vital Signs Last 24 Hours  T(C): 36.8 (03-28-19 @ 05:13), Max: 37.4 (03-27-19 @ 09:04)  HR: 82 (03-28-19 @ 05:13) (77 - 101)  BP: 132/83 (03-28-19 @ 05:13) (132/83 - 180/90)  RR: 18 (03-28-19 @ 05:13) (18 - 18)  SpO2: 96% (03-28-19 @ 05:13) (95% - 97%)    I&O's Summary    27 Mar 2019 07:01  -  28 Mar 2019 07:00  --------------------------------------------------------  IN: 600 mL / OUT: 2050 mL / NET: -1450 mL        Physical Exam:  General: NAD, AAOx3  CV: NR, RR, S1, S2, no M/R/G  Lungs: CTA-B  Abdomen: Soft, non-tender, non-distended, +BS  Incision: suprapubic - c/d/i  Ext: No pain or swelling, SCDs on    Labs:                        15.3   8.7   )-----------( 188      ( 27 Mar 2019 11:20 )             44.6   baso x      eos x      imm gran x      lymph x      mono x      poly x                            15.1   10.3  )-----------( 182      ( 26 Mar 2019 16:35 )             43.4   baso x      eos x      imm gran x      lymph x      mono x      poly x                            14.8   8.8   )-----------( 165      ( 25 Mar 2019 21:56 )             42.2   baso x      eos x      imm gran x      lymph x      mono x      poly x        03-27    136  |  98  |  14  ----------------------------<  152<H>  3.1<L>   |  22  |  0.65    Ca    8.7      27 Mar 2019 18:54            Blood Type: A Positive      MEDICATIONS  (STANDING):  amLODIPine   Tablet 5 milliGRAM(s) Oral at bedtime  cefoTEtan  IVPB 2 Gram(s) IV Intermittent once  dextrose 5% + sodium chloride 0.45% with potassium chloride 20 mEq/L 1000 milliLiter(s) (125 mL/Hr) IV Continuous <Continuous>  enoxaparin Injectable 40 milliGRAM(s) SubCutaneous every 24 hours  famotidine Injectable 20 milliGRAM(s) IV Push daily  lactated ringers. 1000 milliLiter(s) (75 mL/Hr) IV Continuous <Continuous>  losartan 100 milliGRAM(s) Oral daily  ondansetron Injectable 8 milliGRAM(s) IV Push once  simethicone 80 milliGRAM(s) Chew two times a day    MEDICATIONS  (PRN):  ketorolac   Injectable 30 milliGRAM(s) IV Push every 6 hours PRN Moderate Pain (4 - 6)  ondansetron Injectable 4 milliGRAM(s) IV Push every 6 hours PRN Nausea and/or Vomiting

## 2019-03-28 NOTE — PROGRESS NOTE ADULT - ASSESSMENT
74yo POD3 from a TV, USOMER, MUS. She had atypical CP on POD0-1 that has resolved - negative workup and evaluation by cardiology. She remained in house until today due to nausea/emesis on POD1-2, which has also resolved - s/p negative Abdominal XR.     1) Post-op care  -Doing well this morning and feels much improved  -Encourage ambulation and IS  -Failed TOV yesterday - conklin catheter in place - will f/u Friday in office  -Repeat labs this AM  2) Atypical chest pain  -Resolved  -S/p EKG with negative cardiac markers and negative CT PE  -Cardiology consulted - appreciate care   3) Chronic HTN  -Losartan increased to 100mg daily, Amlodipine 5mg at night per Medicine team - appreciate care  -Per medicine, plan for outpatient echocardiogram   4) FEN/GI  -Will have soup this AM and then advance diet   -Hypokalemia - s/p KCl 40MEq yesterday - repeat labs pending for this AM  5) PPX  -SCDs  -Lovenox  Dispo: Anticipate d/c home later today pending labs and clinical evaluation. Patient doing much better this AM. Discussed post-operative restrictions and expectations. 74yo POD3 from a TV, US, MUS. She had atypical CP on POD0-1 that has resolved - negative workup and evaluation by cardiology. She remained in house until today due to nausea/emesis on POD1-2, which has also resolved - s/p negative Abdominal XR.     1) Post-op care  -Doing well this morning and feels much improved  -Encourage ambulation and IS  -Failed TOV yesterday - conklin catheter in place - will f/u Friday in office  -Repeat labs this AM  2) Atypical chest pain  -Resolved  -S/p EKG with negative cardiac markers and negative CT PE  -Cardiology consulted - appreciate care   3) Chronic HTN  -Losartan increased to 100mg daily, Amlodipine 5mg at night per Medicine team - appreciate care  -Per medicine, plan for outpatient echocardiogram   4) FEN/GI  -Will have soup this AM and then advance diet   -S/p 1L bolus yesterday per Dr. Michael f/b CXR which appears unremarkable - final read pending   -Hypokalemia - s/p KCl 40MEq yesterday - repeat labs pending for this AM  5) PPX  -SCDs  -Lovenox  Dispo: Anticipate d/c home later today pending labs and clinical evaluation. Patient doing much better this AM. Discussed post-operative restrictions and expectations.

## 2019-03-28 NOTE — PROVIDER CONTACT NOTE (OTHER) - ASSESSMENT
Pt abdomen soft upon palpation, hypoactive bowel sounds noted, vomiting
Patient complains of nausea and vomiting. Patient medicated as ordered.
Patient resting comfortably, no signs of distress.
Patient resting comfortably, no signs of distress. Patient tolerating bites of Jello.
Pt A&Ox4, denies HA, vision changes, Nausea.
patient denies any headache, SOB, blurred vision or epigastric pain.
patient denies any headache, SOB, blurred vision or epigastric pain.  No nasuea, vomiting or pain reported
patient denies any headache, SOB, blurred vision or epigastric pain.  complained nausea

## 2019-03-28 NOTE — PROVIDER CONTACT NOTE (OTHER) - SITUATION
Patient BP at 1706pm as previously reported at 1735pm was 172/96 hr 80, rechecked at 1808pm is 156/87 hr 98. Patient BP at 1706pm as previously reported at 1735pm was 172/96 hr 80, rechecked at 1808pm is 156/87 hr 98.  Please order PT evaluation per Dr. Michael.

## 2019-03-29 ENCOUNTER — TRANSCRIPTION ENCOUNTER (OUTPATIENT)
Age: 74
End: 2019-03-29

## 2019-03-29 VITALS
TEMPERATURE: 98 F | OXYGEN SATURATION: 97 % | RESPIRATION RATE: 18 BRPM | SYSTOLIC BLOOD PRESSURE: 115 MMHG | HEART RATE: 81 BPM | DIASTOLIC BLOOD PRESSURE: 68 MMHG

## 2019-03-29 LAB
ANION GAP SERPL CALC-SCNC: 16 MMOL/L — SIGNIFICANT CHANGE UP (ref 5–17)
BUN SERPL-MCNC: 12 MG/DL — SIGNIFICANT CHANGE UP (ref 7–23)
CALCIUM SERPL-MCNC: 8.7 MG/DL — SIGNIFICANT CHANGE UP (ref 8.4–10.5)
CHLORIDE SERPL-SCNC: 103 MMOL/L — SIGNIFICANT CHANGE UP (ref 96–108)
CO2 SERPL-SCNC: 17 MMOL/L — LOW (ref 22–31)
CREAT SERPL-MCNC: 0.8 MG/DL — SIGNIFICANT CHANGE UP (ref 0.5–1.3)
GLUCOSE SERPL-MCNC: 132 MG/DL — HIGH (ref 70–99)
HCT VFR BLD CALC: 41.1 % — SIGNIFICANT CHANGE UP (ref 34.5–45)
HGB BLD-MCNC: 14.2 G/DL — SIGNIFICANT CHANGE UP (ref 11.5–15.5)
MCHC RBC-ENTMCNC: 30.7 PG — SIGNIFICANT CHANGE UP (ref 27–34)
MCHC RBC-ENTMCNC: 34.5 GM/DL — SIGNIFICANT CHANGE UP (ref 32–36)
MCV RBC AUTO: 88.9 FL — SIGNIFICANT CHANGE UP (ref 80–100)
PLATELET # BLD AUTO: 183 K/UL — SIGNIFICANT CHANGE UP (ref 150–400)
POTASSIUM SERPL-MCNC: 3.5 MMOL/L — SIGNIFICANT CHANGE UP (ref 3.5–5.3)
POTASSIUM SERPL-SCNC: 3.5 MMOL/L — SIGNIFICANT CHANGE UP (ref 3.5–5.3)
RBC # BLD: 4.62 M/UL — SIGNIFICANT CHANGE UP (ref 3.8–5.2)
RBC # FLD: 12.6 % — SIGNIFICANT CHANGE UP (ref 10.3–14.5)
SODIUM SERPL-SCNC: 136 MMOL/L — SIGNIFICANT CHANGE UP (ref 135–145)
WBC # BLD: 10.4 K/UL — SIGNIFICANT CHANGE UP (ref 3.8–10.5)
WBC # FLD AUTO: 10.4 K/UL — SIGNIFICANT CHANGE UP (ref 3.8–10.5)

## 2019-03-29 PROCEDURE — 74019 RADEX ABDOMEN 2 VIEWS: CPT

## 2019-03-29 PROCEDURE — 80053 COMPREHEN METABOLIC PANEL: CPT

## 2019-03-29 PROCEDURE — 71046 X-RAY EXAM CHEST 2 VIEWS: CPT

## 2019-03-29 PROCEDURE — 82553 CREATINE MB FRACTION: CPT

## 2019-03-29 PROCEDURE — 80048 BASIC METABOLIC PNL TOTAL CA: CPT

## 2019-03-29 PROCEDURE — C1771: CPT

## 2019-03-29 PROCEDURE — 97161 PT EVAL LOW COMPLEX 20 MIN: CPT

## 2019-03-29 PROCEDURE — 93005 ELECTROCARDIOGRAM TRACING: CPT

## 2019-03-29 PROCEDURE — 71275 CT ANGIOGRAPHY CHEST: CPT

## 2019-03-29 PROCEDURE — 88305 TISSUE EXAM BY PATHOLOGIST: CPT

## 2019-03-29 PROCEDURE — 85027 COMPLETE CBC AUTOMATED: CPT

## 2019-03-29 PROCEDURE — 84484 ASSAY OF TROPONIN QUANT: CPT

## 2019-03-29 PROCEDURE — 82962 GLUCOSE BLOOD TEST: CPT

## 2019-03-29 PROCEDURE — 82550 ASSAY OF CK (CPK): CPT

## 2019-03-29 RX ORDER — OLMESARTAN MEDOXOMIL 5 MG/1
1 TABLET, FILM COATED ORAL
Qty: 0 | Refills: 0 | COMMUNITY

## 2019-03-29 RX ORDER — AMLODIPINE BESYLATE 2.5 MG/1
1 TABLET ORAL
Qty: 30 | Refills: 0
Start: 2019-03-29

## 2019-03-29 RX ORDER — LOSARTAN POTASSIUM 100 MG/1
1 TABLET, FILM COATED ORAL
Qty: 30 | Refills: 1
Start: 2019-03-29

## 2019-03-29 RX ADMIN — SIMETHICONE 80 MILLIGRAM(S): 80 TABLET, CHEWABLE ORAL at 06:25

## 2019-03-29 RX ADMIN — LOSARTAN POTASSIUM 100 MILLIGRAM(S): 100 TABLET, FILM COATED ORAL at 06:24

## 2019-03-29 NOTE — DISCHARGE NOTE NURSING/CASE MANAGEMENT/SOCIAL WORK - NSDCDPATPORTLINK_GEN_ALL_CORE
You can access the [facility], offered by [facility], by registering with the following website: [email or web address]

## 2019-03-29 NOTE — PHYSICAL THERAPY INITIAL EVALUATION ADULT - DISCHARGE DISPOSITION, PT EVAL
no skilled PT needs/DC home with no skilled PT needs, assist from family as needed, SW aware, RN aware, pt agrees.

## 2019-03-29 NOTE — PROGRESS NOTE ADULT - PROBLEM SELECTOR PLAN 1
Neuro: PO pain meds prn.    CV: Hemodynamically stable.       -BP: BP overnight 120's/80's.   Losartan increased to 100mg daily, Amlodipine 5mg at night per Medicine team.  Per medicine, plan for outpatient echocardiogram.  Appreciate medicine recs.     -Atypical chest pain: s/p EKG with negative cardiac markers and negative CT PE.  Appreciate cardiology recs.  CP since resolved.   Pulm: Saturating well on room air, encourage oob/amb  GI: Tolerating clears.  Consider advancement of diet this AM.   : UOP adequate.   Failed TOV on POD#1.  Will consider repeat TOV today vs. f/u in office Monday per attending  Heme: c/w SCD's and Lovenox for DVT ppx.  Encourage ambulation  FEN: D5 1/2NS+20K @ 75.  Can consider d/c this AM if tolerates full diet.  replete electrolytes prn.  K improved yesterday s/p K repletion   ID: Afebrile  Endo: No active issues   Dispo: Continue routine post-op care.  Anticipate d/c home later today pending labs and clinical evaluation. Patient doing much better this AM.     Ana Granados PGY-3

## 2019-03-29 NOTE — PROGRESS NOTE ADULT - SUBJECTIVE AND OBJECTIVE BOX
NICKI SHEN Progress Note    POD#4     Patient seen and examined at bedside.  No acute events overnight. No acute complaints. Reports feeling very improved this AM.   Has had two bowel movements - formed.   Is passing gas.  Has an apetite and desires breakfast.  Ambulated last night around room.     Denies CP, SOB, N/V, fevers, and chills.    Vital Signs Last 24 Hours  T(C): 37 (03-29-19 @ 04:41), Max: 37.2 (03-28-19 @ 08:54)  HR: 78 (03-29-19 @ 04:41) (78 - 98)  BP: 125/82 (03-29-19 @ 04:41) (122/83 - 172/96)  RR: 18 (03-29-19 @ 04:41) (18 - 18)  SpO2: 98% (03-29-19 @ 04:41) (96% - 98%)    I&O's Summary    27 Mar 2019 07:01  -  28 Mar 2019 07:00  --------------------------------------------------------  IN: 600 mL / OUT: 2050 mL / NET: -1450 mL    28 Mar 2019 07:01  -  29 Mar 2019 06:51  --------------------------------------------------------  IN: 900 mL / OUT: 1500 mL / NET: -600 mL        Physical Exam:  General: NAD  CV: RR, S1, S2, no M/R/G  Lungs: CTA b/l, good air flow b/l   Abdomen: Soft, NTND, normoactive bowel sounds  Incision: suprapubic - CDI  : no bleeding on pad  Ext: No pain or swelling     Labs:                        15.3   8.7   )-----------( 188      ( 27 Mar 2019 11:20 )             44.6   baso x      eos x      imm gran x      lymph x      mono x      poly x                            15.1   10.3  )-----------( 182      ( 26 Mar 2019 16:35 )             43.4   baso x      eos x      imm gran x      lymph x      mono x      poly x          MEDICATIONS  (STANDING):  amLODIPine   Tablet 5 milliGRAM(s) Oral at bedtime  cefoTEtan  IVPB 2 Gram(s) IV Intermittent once  dextrose 5% + sodium chloride 0.45% with potassium chloride 20 mEq/L 1000 milliLiter(s) (75 mL/Hr) IV Continuous <Continuous>  enoxaparin Injectable 40 milliGRAM(s) SubCutaneous every 24 hours  famotidine Injectable 20 milliGRAM(s) IV Push daily  losartan 100 milliGRAM(s) Oral daily  ondansetron Injectable 8 milliGRAM(s) IV Push once  simethicone 80 milliGRAM(s) Chew two times a day    MEDICATIONS  (PRN):  ketorolac   Injectable 30 milliGRAM(s) IV Push every 6 hours PRN Moderate Pain (4 - 6)  ondansetron Injectable 4 milliGRAM(s) IV Push every 6 hours PRN Nausea and/or Vomiting

## 2019-03-29 NOTE — PHYSICAL THERAPY INITIAL EVALUATION ADULT - ADDITIONAL COMMENTS
pt lives with grandchildren, son is around to assist as available, prior to admission, pt ind amb and ADls, no device, +no stairs to enter, +elevator access.

## 2019-03-29 NOTE — PHYSICAL THERAPY INITIAL EVALUATION ADULT - PRECAUTIONS/LIMITATIONS, REHAB EVAL
Pt with PMH of HTN, lumbar stenosis s/p laminectomy  and fusion x 2(2012,2016). Hospital course: per GYN, POD#4 s/p TVH, USLS, MUS. She had atypical CP on POD0-1 that has resolved - negative workup and evaluation by cardiology. She remained in house until today due to nausea/emesis on POD1-3, which has also resolved - s/p negative Abdominal XR.  Patient stable and doing well this AM.

## 2019-03-29 NOTE — PROGRESS NOTE ADULT - ASSESSMENT
A/P: 73y POD#4 s/p TVH, USLS, MUS. She had atypical CP on POD0-1 that has resolved - negative workup and evaluation by cardiology. She remained in house until today due to nausea/emesis on POD1-3, which has also resolved - s/p negative Abdominal XR.  Patient stable and doing well this AM. A/P: 73y POD#4 s/p TVH, USLS, MUS. She had atypical CP on POD0-1 that has resolved - negative workup and evaluation by cardiology. She remained in house until today due to nausea/emesis on POD1-3, which has also resolved - s/p negative Abdominal XR.  Patient stable and doing well this AM.      72yo POD4 from a [facility], USLS, MUS. She had atypical CP on POD0-1 that has resolved - negative workup and evaluation by cardiology. She remained in house until today due to nausea/emesis on POD1-2, which has also resolved - s/p negative Abdominal XR. She remained in house overnight due to reported issues with ambulation. Patient this AM reports she has been ambulating without any difficulty over the past few days. She ambulates to the restroom multiple times over the past 24hrs and declines a PT consult this AM. She is eager to go home this morning and has ordered breakfast.      1) Post-op care  -Meeting all post-operative milestones  -Encourage ambulation and IS  -Failed TOV yesterday - conklin catheter in place - will f/u Monday in office  -Repeat labs pending this AM  2) Atypical chest pain  -Resolved  -S/p EKG with negative cardiac markers and negative CT PE  -Cardiology consulted - appreciate care   3) Chronic HTN  -Losartan increased to 100mg daily, Amlodipine 5mg at night per Medicine team - appreciate care  -Per medicine, plan for outpatient echocardiogram   4) FEN/GI  -Regular diet today - patient hungry and requesting breakfast and discharge home  -S/p 1L bolus on POD2  -Hypokalemia - s/p KCl 40MEq POD2 - resolved  -Saline locked this morning   5) PPX  -SCDs  -Lovenox  Dispo: D/c home today. Will f/u on AM labs. Discussed post-operative restrictions and expectations.

## 2019-03-29 NOTE — DISCHARGE NOTE NURSING/CASE MANAGEMENT/SOCIAL WORK - NSDCPNDISPN_GEN_ALL_CORE
Safe use, storage and disposal of opioids when prescribed/Activities of daily living, including home environment that might     exacerbate pain or reduce effectiveness of the pain management plan of care as well as strategies to address these issues/Education provided on the pain management plan of care/Side effects of pain management treatment

## 2019-04-01 ENCOUNTER — APPOINTMENT (OUTPATIENT)
Dept: UROGYNECOLOGY | Facility: CLINIC | Age: 74
End: 2019-04-01
Payer: MEDICARE

## 2019-04-01 DIAGNOSIS — Z98.890 OTHER SPECIFIED POSTPROCEDURAL STATES: ICD-10-CM

## 2019-04-01 PROCEDURE — 99024 POSTOP FOLLOW-UP VISIT: CPT

## 2019-04-01 NOTE — OBJECTIVE
[Voiding Trial] : Voiding trial was performed [Soft and Nontender] : soft and nontender [Clean, Dry, Intact] : Clean, Dry, Intact

## 2019-04-02 NOTE — SUBJECTIVE
[FreeTextEntry1] : good [FreeTextEntry7] : denies [FreeTextEntry6] : normal [FreeTextEntry5] : conklin in place [FreeTextEntry4] : soft daily [FreeTextEntry3] : normal [FreeTextEntry2] : normal

## 2019-04-02 NOTE — DISCUSSION/SUMMARY
[FreeTextEntry1] : Herrera catheter d/c'd.  pt advised to call office with any issues. She will RTO in 2 weeks for post-operative exam or sooner if needed.

## 2019-04-16 ENCOUNTER — APPOINTMENT (OUTPATIENT)
Dept: UROGYNECOLOGY | Facility: CLINIC | Age: 74
End: 2019-04-16
Payer: MEDICARE

## 2019-04-16 DIAGNOSIS — Z09 ENCOUNTER FOR FOLLOW-UP EXAMINATION AFTER COMPLETED TREATMENT FOR CONDITIONS OTHER THAN MALIGNANT NEOPLASM: ICD-10-CM

## 2019-04-16 PROCEDURE — 51798 US URINE CAPACITY MEASURE: CPT

## 2019-04-16 PROCEDURE — 99213 OFFICE O/P EST LOW 20 MIN: CPT | Mod: 24,25

## 2019-04-16 NOTE — ASSESSMENT
[FreeTextEntry1] : Constipation and hesitancy.\par \par Recommend colace and miralax daily until more regular then tapering as regularity returns. Bladder emptying appears to be normal and may also feel more compelte with resolution of constipation

## 2019-04-16 NOTE — HISTORY OF PRESENT ILLNESS
[FreeTextEntry1] : Patient with some urine hesitancy and has felt slow with bowel motility and frequency of bowel movements.\par \par Has TVH, uterosacral suspension March 25

## 2019-05-18 ENCOUNTER — EMERGENCY (EMERGENCY)
Facility: HOSPITAL | Age: 74
LOS: 1 days | Discharge: HOME | End: 2019-05-18
Attending: EMERGENCY MEDICINE
Payer: MEDICARE

## 2019-05-18 VITALS
HEART RATE: 96 BPM | HEIGHT: 68 IN | SYSTOLIC BLOOD PRESSURE: 167 MMHG | OXYGEN SATURATION: 98 % | RESPIRATION RATE: 19 BRPM | WEIGHT: 205.03 LBS | DIASTOLIC BLOOD PRESSURE: 108 MMHG | TEMPERATURE: 99 F

## 2019-05-18 DIAGNOSIS — Z90.89 ACQUIRED ABSENCE OF OTHER ORGANS: Chronic | ICD-10-CM

## 2019-05-18 DIAGNOSIS — Z98.89 OTHER SPECIFIED POSTPROCEDURAL STATES: Chronic | ICD-10-CM

## 2019-05-18 LAB
ALBUMIN SERPL ELPH-MCNC: 3.8 G/DL — SIGNIFICANT CHANGE UP (ref 3.5–5)
ALP SERPL-CCNC: 92 U/L — SIGNIFICANT CHANGE UP (ref 40–120)
ALT FLD-CCNC: 17 U/L DA — SIGNIFICANT CHANGE UP (ref 10–60)
ANION GAP SERPL CALC-SCNC: 7 MMOL/L — SIGNIFICANT CHANGE UP (ref 5–17)
APPEARANCE UR: CLEAR — SIGNIFICANT CHANGE UP
AST SERPL-CCNC: 20 U/L — SIGNIFICANT CHANGE UP (ref 10–40)
BASOPHILS # BLD AUTO: 0.04 K/UL — SIGNIFICANT CHANGE UP (ref 0–0.2)
BASOPHILS NFR BLD AUTO: 0.4 % — SIGNIFICANT CHANGE UP (ref 0–2)
BILIRUB SERPL-MCNC: 0.3 MG/DL — SIGNIFICANT CHANGE UP (ref 0.2–1.2)
BILIRUB UR-MCNC: NEGATIVE — SIGNIFICANT CHANGE UP
BUN SERPL-MCNC: 13 MG/DL — SIGNIFICANT CHANGE UP (ref 7–18)
CALCIUM SERPL-MCNC: 9 MG/DL — SIGNIFICANT CHANGE UP (ref 8.4–10.5)
CHLORIDE SERPL-SCNC: 109 MMOL/L — HIGH (ref 96–108)
CO2 SERPL-SCNC: 26 MMOL/L — SIGNIFICANT CHANGE UP (ref 22–31)
COLOR SPEC: YELLOW — SIGNIFICANT CHANGE UP
CREAT SERPL-MCNC: 0.82 MG/DL — SIGNIFICANT CHANGE UP (ref 0.5–1.3)
DIFF PNL FLD: ABNORMAL
EOSINOPHIL # BLD AUTO: 0.14 K/UL — SIGNIFICANT CHANGE UP (ref 0–0.5)
EOSINOPHIL NFR BLD AUTO: 1.6 % — SIGNIFICANT CHANGE UP (ref 0–6)
GLUCOSE SERPL-MCNC: 101 MG/DL — HIGH (ref 70–99)
GLUCOSE UR QL: NEGATIVE — SIGNIFICANT CHANGE UP
HCT VFR BLD CALC: 40.8 % — SIGNIFICANT CHANGE UP (ref 34.5–45)
HGB BLD-MCNC: 13 G/DL — SIGNIFICANT CHANGE UP (ref 11.5–15.5)
IMM GRANULOCYTES NFR BLD AUTO: 0.3 % — SIGNIFICANT CHANGE UP (ref 0–1.5)
KETONES UR-MCNC: NEGATIVE — SIGNIFICANT CHANGE UP
LEUKOCYTE ESTERASE UR-ACNC: NEGATIVE — SIGNIFICANT CHANGE UP
LIDOCAIN IGE QN: 62 U/L — LOW (ref 73–393)
LYMPHOCYTES # BLD AUTO: 2.21 K/UL — SIGNIFICANT CHANGE UP (ref 1–3.3)
LYMPHOCYTES # BLD AUTO: 24.5 % — SIGNIFICANT CHANGE UP (ref 13–44)
MCHC RBC-ENTMCNC: 29.3 PG — SIGNIFICANT CHANGE UP (ref 27–34)
MCHC RBC-ENTMCNC: 31.9 GM/DL — LOW (ref 32–36)
MCV RBC AUTO: 91.9 FL — SIGNIFICANT CHANGE UP (ref 80–100)
MONOCYTES # BLD AUTO: 0.48 K/UL — SIGNIFICANT CHANGE UP (ref 0–0.9)
MONOCYTES NFR BLD AUTO: 5.3 % — SIGNIFICANT CHANGE UP (ref 2–14)
NEUTROPHILS # BLD AUTO: 6.12 K/UL — SIGNIFICANT CHANGE UP (ref 1.8–7.4)
NEUTROPHILS NFR BLD AUTO: 67.9 % — SIGNIFICANT CHANGE UP (ref 43–77)
NITRITE UR-MCNC: NEGATIVE — SIGNIFICANT CHANGE UP
NRBC # BLD: 0 /100 WBCS — SIGNIFICANT CHANGE UP (ref 0–0)
PH UR: 5 — SIGNIFICANT CHANGE UP (ref 5–8)
PLATELET # BLD AUTO: 202 K/UL — SIGNIFICANT CHANGE UP (ref 150–400)
POTASSIUM SERPL-MCNC: 4 MMOL/L — SIGNIFICANT CHANGE UP (ref 3.5–5.3)
POTASSIUM SERPL-SCNC: 4 MMOL/L — SIGNIFICANT CHANGE UP (ref 3.5–5.3)
PROT SERPL-MCNC: 7.9 G/DL — SIGNIFICANT CHANGE UP (ref 6–8.3)
PROT UR-MCNC: NEGATIVE — SIGNIFICANT CHANGE UP
RBC # BLD: 4.44 M/UL — SIGNIFICANT CHANGE UP (ref 3.8–5.2)
RBC # FLD: 14.6 % — HIGH (ref 10.3–14.5)
SODIUM SERPL-SCNC: 142 MMOL/L — SIGNIFICANT CHANGE UP (ref 135–145)
SP GR SPEC: 1.01 — SIGNIFICANT CHANGE UP (ref 1.01–1.02)
UROBILINOGEN FLD QL: NEGATIVE — SIGNIFICANT CHANGE UP
WBC # BLD: 9.02 K/UL — SIGNIFICANT CHANGE UP (ref 3.8–10.5)
WBC # FLD AUTO: 9.02 K/UL — SIGNIFICANT CHANGE UP (ref 3.8–10.5)

## 2019-05-18 PROCEDURE — 99284 EMERGENCY DEPT VISIT MOD MDM: CPT

## 2019-05-18 RX ORDER — SODIUM CHLORIDE 9 MG/ML
1000 INJECTION INTRAMUSCULAR; INTRAVENOUS; SUBCUTANEOUS ONCE
Refills: 0 | Status: COMPLETED | OUTPATIENT
Start: 2019-05-18 | End: 2019-05-18

## 2019-05-18 RX ORDER — IOHEXOL 300 MG/ML
30 INJECTION, SOLUTION INTRAVENOUS ONCE
Refills: 0 | Status: COMPLETED | OUTPATIENT
Start: 2019-05-18 | End: 2019-05-18

## 2019-05-18 RX ORDER — ONDANSETRON 8 MG/1
4 TABLET, FILM COATED ORAL ONCE
Refills: 0 | Status: COMPLETED | OUTPATIENT
Start: 2019-05-18 | End: 2019-05-18

## 2019-05-18 RX ADMIN — SODIUM CHLORIDE 1000 MILLILITER(S): 9 INJECTION INTRAMUSCULAR; INTRAVENOUS; SUBCUTANEOUS at 22:40

## 2019-05-18 RX ADMIN — IOHEXOL 30 MILLILITER(S): 300 INJECTION, SOLUTION INTRAVENOUS at 22:46

## 2019-05-18 RX ADMIN — ONDANSETRON 4 MILLIGRAM(S): 8 TABLET, FILM COATED ORAL at 22:41

## 2019-05-18 NOTE — ED PROVIDER NOTE - OBJECTIVE STATEMENT
74 y/o female with a PMHx of HTN, s/p hysterectomy presents to the ED c/o not having a BM in 4 days, 1 episode of vomiting today and lower abd pain. Pt states pain makes it difficult to sit down. No fever, chills or any other acute complaints at this time.

## 2019-05-18 NOTE — ED ADULT NURSE NOTE - PMH
Asthma  Pt has not had asthma in 5 years  Cystocele, midline    HTN (hypertension)  on meds  Lumbar spinal stenosis    Osteoporosis    Stress incontinence in female    Uterine prolapse

## 2019-05-18 NOTE — ED PROVIDER NOTE - PROGRESS NOTE DETAILS
ct shows large fecal load otherwise unremarkable. will dc with lactulose. f/u PMD. return rpecautions given.

## 2019-05-18 NOTE — ED ADULT TRIAGE NOTE - CHIEF COMPLAINT QUOTE
MA: Please notify patient that Dr. Mirta Perry reviewed her lab results and her TSH is normal. No medications are needed at this time. Please ask patient to repeat thyroid lab work 1 week prior to her office visit on 2/4/19. Thank you. TSH lab order entered. c/o lower abd pain /constipation /back pain x 4 days

## 2019-05-19 VITALS
RESPIRATION RATE: 18 BRPM | HEART RATE: 83 BPM | DIASTOLIC BLOOD PRESSURE: 82 MMHG | TEMPERATURE: 100 F | OXYGEN SATURATION: 100 % | SYSTOLIC BLOOD PRESSURE: 152 MMHG

## 2019-05-19 PROCEDURE — 80053 COMPREHEN METABOLIC PANEL: CPT

## 2019-05-19 PROCEDURE — 85027 COMPLETE CBC AUTOMATED: CPT

## 2019-05-19 PROCEDURE — 96374 THER/PROPH/DIAG INJ IV PUSH: CPT | Mod: XU

## 2019-05-19 PROCEDURE — 83690 ASSAY OF LIPASE: CPT

## 2019-05-19 PROCEDURE — 74177 CT ABD & PELVIS W/CONTRAST: CPT | Mod: 26

## 2019-05-19 PROCEDURE — 36415 COLL VENOUS BLD VENIPUNCTURE: CPT

## 2019-05-19 PROCEDURE — 81001 URINALYSIS AUTO W/SCOPE: CPT

## 2019-05-19 PROCEDURE — 99284 EMERGENCY DEPT VISIT MOD MDM: CPT | Mod: 25

## 2019-05-19 PROCEDURE — 74177 CT ABD & PELVIS W/CONTRAST: CPT

## 2019-05-19 RX ORDER — LACTULOSE 10 G/15ML
30 SOLUTION ORAL
Qty: 300 | Refills: 0
Start: 2019-05-19 | End: 2019-05-21

## 2019-07-16 ENCOUNTER — APPOINTMENT (OUTPATIENT)
Dept: UROGYNECOLOGY | Facility: CLINIC | Age: 74
End: 2019-07-16
Payer: MEDICARE

## 2019-07-16 DIAGNOSIS — R39.11 HESITANCY OF MICTURITION: ICD-10-CM

## 2019-07-16 DIAGNOSIS — N81.9 FEMALE GENITAL PROLAPSE, UNSPECIFIED: ICD-10-CM

## 2019-07-16 PROCEDURE — 99214 OFFICE O/P EST MOD 30 MIN: CPT

## 2019-07-16 NOTE — HISTORY OF PRESENT ILLNESS
[FreeTextEntry1] : This 74-year-old woman underwent a vaginal hysterectomy uterosacral suspension and suburethral sling on March 25, 2019. She has relief of pelvic prolapse symptoms and no issues of frequency urgency or incontinence. She's very happy with complete resolution of her symptoms.\par \par She does describe moderate constipation which is not new for her. She indicates that she may not be having enough fiber. We discussed about regimen including stool softener MiraLax as well as high fiber and emphasize the importance of avoiding constipation. She understands that persistent constipation would be a risk factor for failure of the procedure.\par \par On examination the abdomen was soft nontender extremities no pain pelvic examination shows excellent support site in all locations the interval is -2 the apex is -9 posterior wall is -3 relative to the hymen. \par \par Counseling regarding constipation was the main component of this encounter.\par \par Counseling was greater than 50 percent of encounter which included  26   minute face to face time for direct counseling.\par

## 2019-11-21 LAB
BACTERIA UR CULT: NORMAL
BILIRUB UR QL STRIP: NORMAL
CLARITY UR: CLEAR
COLLECTION METHOD: NORMAL
GLUCOSE UR-MCNC: NORMAL
HCG UR QL: 0.2 EU/DL
HGB UR QL STRIP.AUTO: NORMAL
KETONES UR-MCNC: NORMAL
LEUKOCYTE ESTERASE UR QL STRIP: NORMAL
NITRITE UR QL STRIP: NORMAL
PH UR STRIP: 7
PROT UR STRIP-MCNC: NORMAL
SP GR UR STRIP: 1.01

## 2021-08-09 NOTE — PATIENT PROFILE ADULT - VISION (WITH CORRECTIVE LENSES IF THE PATIENT USUALLY WEARS THEM):
What Type Of Note Output Would You Prefer (Optional)?: Standard Output Is The Patient Presenting As Previously Scheduled?: Yes How Severe Is Your Rash?: moderate Partially impaired: cannot see medication labels or newsprint, but can see obstacles in path, and the surrounding layout; can count fingers at arm's length Is This A New Presentation, Or A Follow-Up?: Rash

## 2023-09-09 ENCOUNTER — INPATIENT (INPATIENT)
Facility: HOSPITAL | Age: 78
LOS: 1 days | Discharge: HOME | DRG: 392 | End: 2023-09-11
Attending: INTERNAL MEDICINE | Admitting: INTERNAL MEDICINE
Payer: MEDICARE

## 2023-09-09 VITALS
SYSTOLIC BLOOD PRESSURE: 171 MMHG | OXYGEN SATURATION: 97 % | TEMPERATURE: 99 F | HEART RATE: 119 BPM | RESPIRATION RATE: 19 BRPM | DIASTOLIC BLOOD PRESSURE: 108 MMHG

## 2023-09-09 DIAGNOSIS — E78.5 HYPERLIPIDEMIA, UNSPECIFIED: ICD-10-CM

## 2023-09-09 DIAGNOSIS — Z98.89 OTHER SPECIFIED POSTPROCEDURAL STATES: Chronic | ICD-10-CM

## 2023-09-09 DIAGNOSIS — Z29.9 ENCOUNTER FOR PROPHYLACTIC MEASURES, UNSPECIFIED: ICD-10-CM

## 2023-09-09 DIAGNOSIS — Z90.89 ACQUIRED ABSENCE OF OTHER ORGANS: Chronic | ICD-10-CM

## 2023-09-09 DIAGNOSIS — K52.89 OTHER SPECIFIED NONINFECTIVE GASTROENTERITIS AND COLITIS: ICD-10-CM

## 2023-09-09 DIAGNOSIS — K62.89 OTHER SPECIFIED DISEASES OF ANUS AND RECTUM: ICD-10-CM

## 2023-09-09 DIAGNOSIS — I10 ESSENTIAL (PRIMARY) HYPERTENSION: ICD-10-CM

## 2023-09-09 LAB
ALBUMIN SERPL ELPH-MCNC: 3.8 G/DL — SIGNIFICANT CHANGE UP (ref 3.5–5)
ALP SERPL-CCNC: 104 U/L — SIGNIFICANT CHANGE UP (ref 40–120)
ALT FLD-CCNC: 21 U/L DA — SIGNIFICANT CHANGE UP (ref 10–60)
ANION GAP SERPL CALC-SCNC: 5 MMOL/L — SIGNIFICANT CHANGE UP (ref 5–17)
APTT BLD: 37.3 SEC — HIGH (ref 24.5–35.6)
AST SERPL-CCNC: 17 U/L — SIGNIFICANT CHANGE UP (ref 10–40)
BASOPHILS # BLD AUTO: 0.03 K/UL — SIGNIFICANT CHANGE UP (ref 0–0.2)
BASOPHILS NFR BLD AUTO: 0.2 % — SIGNIFICANT CHANGE UP (ref 0–2)
BILIRUB SERPL-MCNC: 0.3 MG/DL — SIGNIFICANT CHANGE UP (ref 0.2–1.2)
BUN SERPL-MCNC: 18 MG/DL — SIGNIFICANT CHANGE UP (ref 7–18)
CALCIUM SERPL-MCNC: 9.2 MG/DL — SIGNIFICANT CHANGE UP (ref 8.4–10.5)
CHLORIDE SERPL-SCNC: 110 MMOL/L — HIGH (ref 96–108)
CO2 SERPL-SCNC: 25 MMOL/L — SIGNIFICANT CHANGE UP (ref 22–31)
CREAT SERPL-MCNC: 1.05 MG/DL — SIGNIFICANT CHANGE UP (ref 0.5–1.3)
EGFR: 55 ML/MIN/1.73M2 — LOW
EOSINOPHIL # BLD AUTO: 0.05 K/UL — SIGNIFICANT CHANGE UP (ref 0–0.5)
EOSINOPHIL NFR BLD AUTO: 0.4 % — SIGNIFICANT CHANGE UP (ref 0–6)
GLUCOSE SERPL-MCNC: 152 MG/DL — HIGH (ref 70–99)
HCT VFR BLD CALC: 43 % — SIGNIFICANT CHANGE UP (ref 34.5–45)
HGB BLD-MCNC: 14.2 G/DL — SIGNIFICANT CHANGE UP (ref 11.5–15.5)
IMM GRANULOCYTES NFR BLD AUTO: 0.2 % — SIGNIFICANT CHANGE UP (ref 0–0.9)
INR BLD: 0.97 RATIO — SIGNIFICANT CHANGE UP (ref 0.85–1.18)
LACTATE SERPL-SCNC: 1.6 MMOL/L — SIGNIFICANT CHANGE UP (ref 0.7–2)
LIDOCAIN IGE QN: 19 U/L — SIGNIFICANT CHANGE UP (ref 13–75)
LYMPHOCYTES # BLD AUTO: 1.97 K/UL — SIGNIFICANT CHANGE UP (ref 1–3.3)
LYMPHOCYTES # BLD AUTO: 15.2 % — SIGNIFICANT CHANGE UP (ref 13–44)
MCHC RBC-ENTMCNC: 29.3 PG — SIGNIFICANT CHANGE UP (ref 27–34)
MCHC RBC-ENTMCNC: 33 GM/DL — SIGNIFICANT CHANGE UP (ref 32–36)
MCV RBC AUTO: 88.8 FL — SIGNIFICANT CHANGE UP (ref 80–100)
MONOCYTES # BLD AUTO: 0.74 K/UL — SIGNIFICANT CHANGE UP (ref 0–0.9)
MONOCYTES NFR BLD AUTO: 5.7 % — SIGNIFICANT CHANGE UP (ref 2–14)
NEUTROPHILS # BLD AUTO: 10.14 K/UL — HIGH (ref 1.8–7.4)
NEUTROPHILS NFR BLD AUTO: 78.3 % — HIGH (ref 43–77)
NRBC # BLD: 0 /100 WBCS — SIGNIFICANT CHANGE UP (ref 0–0)
PLATELET # BLD AUTO: 216 K/UL — SIGNIFICANT CHANGE UP (ref 150–400)
POTASSIUM SERPL-MCNC: 3.7 MMOL/L — SIGNIFICANT CHANGE UP (ref 3.5–5.3)
POTASSIUM SERPL-SCNC: 3.7 MMOL/L — SIGNIFICANT CHANGE UP (ref 3.5–5.3)
PROT SERPL-MCNC: 7.9 G/DL — SIGNIFICANT CHANGE UP (ref 6–8.3)
PROTHROM AB SERPL-ACNC: 11.1 SEC — SIGNIFICANT CHANGE UP (ref 9.5–13)
RBC # BLD: 4.84 M/UL — SIGNIFICANT CHANGE UP (ref 3.8–5.2)
RBC # FLD: 13.8 % — SIGNIFICANT CHANGE UP (ref 10.3–14.5)
SODIUM SERPL-SCNC: 140 MMOL/L — SIGNIFICANT CHANGE UP (ref 135–145)
WBC # BLD: 12.96 K/UL — HIGH (ref 3.8–10.5)
WBC # FLD AUTO: 12.96 K/UL — HIGH (ref 3.8–10.5)

## 2023-09-09 PROCEDURE — 74177 CT ABD & PELVIS W/CONTRAST: CPT | Mod: 26,MA

## 2023-09-09 PROCEDURE — 99223 1ST HOSP IP/OBS HIGH 75: CPT | Mod: GC

## 2023-09-09 PROCEDURE — 99285 EMERGENCY DEPT VISIT HI MDM: CPT

## 2023-09-09 RX ORDER — SODIUM CHLORIDE 9 MG/ML
1000 INJECTION, SOLUTION INTRAVENOUS
Refills: 0 | Status: DISCONTINUED | OUTPATIENT
Start: 2023-09-09 | End: 2023-09-11

## 2023-09-09 RX ORDER — SODIUM CHLORIDE 9 MG/ML
1000 INJECTION, SOLUTION INTRAVENOUS ONCE
Refills: 0 | Status: COMPLETED | OUTPATIENT
Start: 2023-09-09 | End: 2023-09-09

## 2023-09-09 RX ORDER — SENNA PLUS 8.6 MG/1
2 TABLET ORAL AT BEDTIME
Refills: 0 | Status: DISCONTINUED | OUTPATIENT
Start: 2023-09-09 | End: 2023-09-11

## 2023-09-09 RX ORDER — ONDANSETRON 8 MG/1
4 TABLET, FILM COATED ORAL EVERY 8 HOURS
Refills: 0 | Status: DISCONTINUED | OUTPATIENT
Start: 2023-09-09 | End: 2023-09-11

## 2023-09-09 RX ORDER — ACETAMINOPHEN 500 MG
650 TABLET ORAL EVERY 6 HOURS
Refills: 0 | Status: DISCONTINUED | OUTPATIENT
Start: 2023-09-09 | End: 2023-09-11

## 2023-09-09 RX ORDER — PIPERACILLIN AND TAZOBACTAM 4; .5 G/20ML; G/20ML
3.38 INJECTION, POWDER, LYOPHILIZED, FOR SOLUTION INTRAVENOUS ONCE
Refills: 0 | Status: COMPLETED | OUTPATIENT
Start: 2023-09-09 | End: 2023-09-09

## 2023-09-09 RX ORDER — POLYETHYLENE GLYCOL 3350 17 G/17G
17 POWDER, FOR SOLUTION ORAL ONCE
Refills: 0 | Status: COMPLETED | OUTPATIENT
Start: 2023-09-09 | End: 2023-09-09

## 2023-09-09 RX ORDER — ATORVASTATIN CALCIUM 80 MG/1
20 TABLET, FILM COATED ORAL AT BEDTIME
Refills: 0 | Status: DISCONTINUED | OUTPATIENT
Start: 2023-09-09 | End: 2023-09-11

## 2023-09-09 RX ORDER — ACETAMINOPHEN 500 MG
1000 TABLET ORAL ONCE
Refills: 0 | Status: COMPLETED | OUTPATIENT
Start: 2023-09-09 | End: 2023-09-09

## 2023-09-09 RX ORDER — LOSARTAN POTASSIUM 100 MG/1
25 TABLET, FILM COATED ORAL DAILY
Refills: 0 | Status: DISCONTINUED | OUTPATIENT
Start: 2023-09-09 | End: 2023-09-11

## 2023-09-09 RX ORDER — POLYETHYLENE GLYCOL 3350 17 G/17G
17 POWDER, FOR SOLUTION ORAL DAILY
Refills: 0 | Status: DISCONTINUED | OUTPATIENT
Start: 2023-09-09 | End: 2023-09-11

## 2023-09-09 RX ORDER — ACETAMINOPHEN 500 MG
2 TABLET ORAL
Qty: 0 | Refills: 0 | DISCHARGE

## 2023-09-09 RX ORDER — ENOXAPARIN SODIUM 100 MG/ML
40 INJECTION SUBCUTANEOUS EVERY 24 HOURS
Refills: 0 | Status: DISCONTINUED | OUTPATIENT
Start: 2023-09-09 | End: 2023-09-11

## 2023-09-09 RX ORDER — LANOLIN ALCOHOL/MO/W.PET/CERES
3 CREAM (GRAM) TOPICAL AT BEDTIME
Refills: 0 | Status: DISCONTINUED | OUTPATIENT
Start: 2023-09-09 | End: 2023-09-11

## 2023-09-09 RX ADMIN — SENNA PLUS 2 TABLET(S): 8.6 TABLET ORAL at 21:56

## 2023-09-09 RX ADMIN — POLYETHYLENE GLYCOL 3350 17 GRAM(S): 17 POWDER, FOR SOLUTION ORAL at 08:13

## 2023-09-09 RX ADMIN — LOSARTAN POTASSIUM 25 MILLIGRAM(S): 100 TABLET, FILM COATED ORAL at 21:57

## 2023-09-09 RX ADMIN — Medication 400 MILLIGRAM(S): at 05:05

## 2023-09-09 RX ADMIN — SODIUM CHLORIDE 1000 MILLILITER(S): 9 INJECTION, SOLUTION INTRAVENOUS at 05:05

## 2023-09-09 RX ADMIN — SODIUM CHLORIDE 75 MILLILITER(S): 9 INJECTION, SOLUTION INTRAVENOUS at 13:27

## 2023-09-09 RX ADMIN — ENOXAPARIN SODIUM 40 MILLIGRAM(S): 100 INJECTION SUBCUTANEOUS at 12:30

## 2023-09-09 RX ADMIN — Medication 1 ENEMA: at 07:13

## 2023-09-09 RX ADMIN — PIPERACILLIN AND TAZOBACTAM 200 GRAM(S): 4; .5 INJECTION, POWDER, LYOPHILIZED, FOR SOLUTION INTRAVENOUS at 08:12

## 2023-09-09 RX ADMIN — POLYETHYLENE GLYCOL 3350 17 GRAM(S): 17 POWDER, FOR SOLUTION ORAL at 12:31

## 2023-09-09 RX ADMIN — ATORVASTATIN CALCIUM 20 MILLIGRAM(S): 80 TABLET, FILM COATED ORAL at 21:56

## 2023-09-09 NOTE — ED PROVIDER NOTE - PHYSICAL EXAMINATION
CONSTITUTIONAL: non-toxic, well appearing  SKIN: no rash, no petechiae.  EYES: pink conjunctiva, anicteric  ENT: tongue and uvular midline, no exudates, moist mucous membranes  NECK: Supple; no meningismus, no JVD  CARD: RRR, no murmurs, equal radial pulses bilaterally 2+  RESP: CTAB, no respiratory distress  ABD: Soft, tender over LLQ, non-distended, no peritoneal signs, no CVA tenderness  EXT: Normal ROM x4. No edema.   NEURO: Alert, oriented. Neuro exam nonfocal.  PSYCH: Cooperative, appropriate.

## 2023-09-09 NOTE — H&P ADULT - PROBLEM SELECTOR PLAN 1
presenting with multiple episodes of loose stool x 1 day, reports feeling constipated for about 1 week  CTAP: Stool distended rectum with mild rectal wall thickening and perirectal fatty stranding suggestive of stercoral proctitis.  manual disimpaction twice in ED   enema x 2 in ED   WBC 12.96, likely reactive  no fevers or chills reported, no concern for infectious cause  gentle hydration, bowel regimen  FLD

## 2023-09-09 NOTE — ED PROVIDER NOTE - NSICDXPASTSURGICALHX_GEN_ALL_CORE_FT
PAST SURGICAL HISTORY:  H/O adenoidectomy     H/O laminectomy L4-L5- 2012 , 2016    S/P colonoscopy 2004 and 2009,2017    S/P tonsillectomy 1974    S/P tubal ligation 1977

## 2023-09-09 NOTE — ED PROVIDER NOTE - PROGRESS NOTE DETAILS
Lucks-DO: CT showing stercoral proctitis. Rectal exam/fecal disimpaction performed with hard brown stool present, YADIRA Carlisle present. Enema, miralax, and abx ordered. Discussed with hospitalist and MAR re: admission.

## 2023-09-09 NOTE — ED PROVIDER NOTE - CLINICAL SUMMARY MEDICAL DECISION MAKING FREE TEXT BOX
Carlos: 78-year-old female with past medical history of hypertension, diabetes, surgical history significant for hysterectomy presents with abdominal pain today.  Patient states she was in usual state of health yesterday, reports 15+ episodes of loose stools today associate with left lower quadrant abdominal pain radiating to her back.  Denies any fevers, chest pain, shortness of breath, vomiting, bloody stools, black tarry stools, dysuria, numbness, focal weakness, rash.  Denies any ill contacts, recent antibiotic use, or recent travel.  Physical exam per above. Unclear etiology, ddx includes but not limited to colitis, diverticulitis, obstruction, viral syndrome, foodborne illness. Will obtain labs, imaging, provide supportive treatment with dispo pending workup.

## 2023-09-09 NOTE — H&P ADULT - HISTORY OF PRESENT ILLNESS
78F with PMH HTN presenting to the ED due to abdominal pain and loose stool x 1 day. Pt reports that she has had multiple episodes of loose stool since 1PM yesterday. She reports she has had been feeling bloated and constipated for at least several days, last BM with formed stool was on Monday. Due to the constipation, she decided to drink prune juice and an herbal tea and has been having loose stool since. She endorses a LLQ pain that started today, radiating to the back, intermittent, 5/10.  She denies fevers, chills, CP, SOB, N/V, dysuria, numbness/tingling/weakness in extremities.

## 2023-09-09 NOTE — H&P ADULT - ASSESSMENT
78F with PMH HTN presenting to the ED due to abdominal pain and loose stool x 1 day admitted for stercoral proctitis

## 2023-09-09 NOTE — ED ADULT NURSE NOTE - OBJECTIVE STATEMENT
As per patient c/o abdominal pain x1 day. Patient reports LLQ pain, patient denies any nausea, and vomiting but complaining of diarrhea. Pt has multiple episodes of diarrhea upon arrival to the ED. No apparent distress noted. Pt denies any other symptoms

## 2023-09-09 NOTE — H&P ADULT - PROBLEM SELECTOR PLAN 2
h/o HTN  takes olmesartan 40 and HCTZ 12.5 at home  /84 on admission  will restart losartan 25 for now  monitor BP

## 2023-09-09 NOTE — ED PROVIDER NOTE - OBJECTIVE STATEMENT
78-year-old female with past medical history of hypertension, diabetes, surgical history significant for hysterectomy presents with abdominal pain today.  Patient states she was in usual state of health yesterday, reports 15+ episodes of loose stools today associate with left lower quadrant abdominal pain radiating to her back.  Denies any fevers, chest pain, shortness of breath, vomiting, bloody stools, black tarry stools, dysuria, numbness, focal weakness, rash.  Denies any ill contacts, recent antibiotic use, or recent travel.  Denies any additional complaints.

## 2023-09-09 NOTE — ED PROVIDER NOTE - NSICDXPASTMEDICALHX_GEN_ALL_CORE_FT
PAST MEDICAL HISTORY:  Asthma Pt has not had asthma in 5 years    Cystocele, midline     HTN (hypertension) on meds    Lumbar spinal stenosis     Osteoporosis     Stress incontinence in female     Uterine prolapse

## 2023-09-09 NOTE — H&P ADULT - NSHPPHYSICALEXAM_GEN_ALL_CORE
General - NAD, sitting up in bed, well groomed  Eyes - PERRLA, EOM intact  ENT - Nonicteric sclerae, PERRLA, EOMI. Oropharynx clear. Moist mucous membranes. Conjunctivae appear well perfused.   Neck - No noticeable or palpable swelling, redness or rash around throat or on face  Lymph Nodes - No lymphadenopathy  Cardiovascular - RRR no m/r/g, no JVD, no carotid bruits  Lungs - Clear to auscultation, no use of accessory muscles, no crackles or wheezes.  Skin - No rashes, skin warm and dry, no erythematous areas  Abdomen - (+) LLQ tender to palpation. Normal bowel sounds, abdomen soft   Rectal – Rectal exam not performed since no symptoms indicated blood loss.  Extremities - No edema, cyanosis or clubbing  Musculoskeletal - 5/5 strength, normal range of motion, no swollen or erythematous joints.  Neuro– Alert and oriented x 3, CN 2-12 grossly intact.

## 2023-09-09 NOTE — H&P ADULT - ATTENDING COMMENTS
Patient seen and exmained with Son and Daughter in law at bedside    77 y/o Femlae with PMH HTN on Olmesartan and HCTZ presenting to the ED due to abdominal pain and loose stool x 1 day. Pt reports that she has had multiple episodes of loose stool since 1PM yesterday. She reports she has had been feeling bloated and constipated for at least several days, last BM with formed stool was on Monday 5 days ago. Due to the constipation, she decided to drink prune juice and an herbal tea and has been having loose stool since. She endorses a LLQ pain that started today, radiating to the back, intermittent, 5/10.  She denies fevers, chills, CP, SOB, N/V, dysuria, numbness/tingling/weakness in extremities.    No fever; Denies SOB, palpitations, chest pain, nausea, vomiting, diarrhea, constipation, dizziness. No prior history of similar complaints  Last Colonoscopy few months ago no acute pathology.    ROS: As above  SH: Lives alone; ambulate independently except uses a cane outside; Non smoker; No alcohol use  PSH:   FH: No significant Hx Intestinal pathology    Vital Signs Last 24 Hrs  T(C): 37.6 (09 Sep 2023 11:30), Max: 37.6 (09 Sep 2023 11:30)  T(F): 99.7 (09 Sep 2023 11:30), Max: 99.7 (09 Sep 2023 11:30)  HR: 86 (09 Sep 2023 11:30) (86 - 119)  BP: 154/94 (09 Sep 2023 11:30) (148/84 - 171/108)  RR: 18 (09 Sep 2023 11:30) (18 - 19)  SpO2: 96% (09 Sep 2023 11:30) (95% - 97%) room air    P/E:  elderly female, comfortable in bed NAD  Psych: AAO x3  Neuro: No gross focal deficits; Power and sensation intact  CVS: S1S2 present, regular, no edema  Resp: BLAE+, No wheeze or Rhonchi  GI: Soft, BS+, mildly  tender, non distended  Extr: No  calf tenderness B/L Lower extremities  Skin: Warm and moist without any rashes Patient seen and examined with Son and Daughter in law at bedside    79 y/o Femlae with PMH HTN on Olmesartan and HCTZ presenting to the ED due to abdominal pain and loose stool x 1 day. Pt reports that she has had multiple episodes of loose stool since 1PM yesterday. She reports she has had been feeling bloated and constipated for at least several days, last BM with formed stool was on Monday 5 days ago. Due to the constipation, she decided to drink prune juice and an herbal tea and has been having loose stool since. She endorses a LLQ pain that started today, radiating to the back, intermittent, 5/10.  She denies fevers, chills, CP, SOB, N/V, dysuria, numbness/tingling/weakness in extremities.    No fever; Denies SOB, palpitations, chest pain, nausea, vomiting, diarrhea, constipation, dizziness. No prior history of similar complaints  Last Colonoscopy few months ago no acute pathology.    ROS: As above  PSH: Midurethral Sling, Cystoscopy ,Anterior  colporrhaphy Total Vaginal Hysterectomy Uterosacral Suspension on 3/25/2019.  Hx Spinal stenosis and Laminectomy 2012/2016    SH: Lives alone; ambulate independently except uses a cane outside; Non smoker; No alcohol use  PSH:   FH: No significant Hx Intestinal pathology    Vital Signs Last 24 Hrs  T(C): 37.6 (09 Sep 2023 11:30), Max: 37.6 (09 Sep 2023 11:30)  T(F): 99.7 (09 Sep 2023 11:30), Max: 99.7 (09 Sep 2023 11:30)  HR: 86 (09 Sep 2023 11:30) (86 - 119)  BP: 154/94 (09 Sep 2023 11:30) (148/84 - 171/108)  RR: 18 (09 Sep 2023 11:30) (18 - 19)  SpO2: 96% (09 Sep 2023 11:30) (95% - 97%) room air    P/E:  elderly female, comfortable in bed NAD  Psych: AAO x3  Neuro: No gross focal deficits; Power and sensation intact  CVS: S1S2 present, regular, no edema  Resp: BLAE+, No wheeze or Rhonchi  GI: Soft, BS+, mildly  tender, non distended  Extr: No  calf tenderness B/L Lower extremities  Skin: Warm and moist without any rashes Patient seen and examined with Son and Daughter in law at bedside    77 y/o Female with PMH HTN on Olmesartan and HCTZ presenting to the ED due to abdominal pain and loose stool x 1 day. Pt reports that she has had multiple episodes of loose stool since 1PM yesterday. She reports she has had been feeling bloated and constipated for at least several days, last BM with formed stool was on Monday 5 days ago. Due to the constipation, she decided to drink prune juice and an herbal tea and has been having loose stool since. She endorses a LLQ pain that started today, radiating to the back, intermittent, 5/10.  She denies fevers, chills, CP, SOB, N/V, dysuria, numbness/tingling/weakness in extremities.    No fever; Denies SOB, palpitations, chest pain, nausea, vomiting, diarrhea, constipation, dizziness. No prior history of similar complaints  Last Colonoscopy few months ago no acute pathology.    ROS: As above  PSH: Midurethral Sling, Cystoscopy ,Anterior  colporrhaphy Total Vaginal Hysterectomy Uterosacral Suspension on 3/25/2019.  Hx Spinal stenosis and Laminectomy 2012/2016    SH: Lives alone; ambulate independently except uses a cane outside; Non smoker; No alcohol use  PSH:   FH: No significant Hx Intestinal pathology    Vital Signs Last 24 Hrs  T(C): 37.6 (09 Sep 2023 11:30), Max: 37.6 (09 Sep 2023 11:30)  T(F): 99.7 (09 Sep 2023 11:30), Max: 99.7 (09 Sep 2023 11:30)  HR: 86 (09 Sep 2023 11:30) (86 - 119)  BP: 154/94 (09 Sep 2023 11:30) (148/84 - 171/108)  RR: 18 (09 Sep 2023 11:30) (18 - 19)  SpO2: 96% (09 Sep 2023 11:30) (95% - 97%) room air    P/E:  elderly female, comfortable in bed NAD  Psych: AAO x3  Neuro: No gross focal deficits; Power and sensation intact  CVS: S1S2 present, regular, no edema  Resp: BLAE+, No wheeze or Rhonchi  GI: Soft, BS+, mildly  tender, non distended  Extr: No  calf tenderness B/L Lower extremities  Skin: Warm and moist without any rashes    Labs:                        14.2   12.96 )-----------( 216      ( 09 Sep 2023 04:48 )             43.0   09-09    140  |  110<H>  |  18  ----------------------------<  152<H>  3.7   |  25  |  1.05    Ca    9.2      09 Sep 2023 04:48  TPro  7.9  /  Alb  3.8  /  TBili  0.3  /  DBili  x   /  AST  17  /  ALT  21  /  AlkPhos  104  09-09    D/D:  Fecal impaction with subsequent diarrhea with  Stercoral proctitis  HTN controlled  Hx Vaginal prolapse s/p TVH and Midurethral sling and ureterosacral suspension  Hx spinal stenosis [id number] Patient seen and examined with Son and Daughter in law at bedside    79 y/o Female with PMH HTN on Olmesartan and HCTZ presenting to the ED due to abdominal pain and loose stool x 1 day. Pt reports that she has had multiple episodes of loose stool since 1PM yesterday. She reports she has had been feeling bloated and constipated for at least several days, last BM with formed stool was on Monday 5 days ago. Due to the constipation, she decided to drink prune juice and an herbal tea and has been having loose stool since. She endorses a LLQ pain that started today, radiating to the back, intermittent, 5/10.  She denies fevers, chills, CP, SOB, N/V, dysuria, numbness/tingling/weakness in extremities.    No fever; Denies SOB, palpitations, chest pain, nausea, vomiting, diarrhea, constipation, dizziness. No prior history of similar complaints  Last Colonoscopy few months ago no acute pathology.    ROS: As above  PSH: Midurethral Sling, Cystoscopy ,Anterior  colporrhaphy Total Vaginal Hysterectomy Uterosacral Suspension on 3/25/2019.  Hx Spinal stenosis and Laminectomy 2012/2016    SH: Lives alone; ambulate independently except uses a cane outside; Non smoker; No alcohol use  PSH:   FH: No significant Hx Intestinal pathology    Vital Signs Last 24 Hrs  T(C): 37.6 (09 Sep 2023 11:30), Max: 37.6 (09 Sep 2023 11:30)  T(F): 99.7 (09 Sep 2023 11:30), Max: 99.7 (09 Sep 2023 11:30)  HR: 86 (09 Sep 2023 11:30) (86 - 119)  BP: 154/94 (09 Sep 2023 11:30) (148/84 - 171/108)  RR: 18 (09 Sep 2023 11:30) (18 - 19)  SpO2: 96% (09 Sep 2023 11:30) (95% - 97%) room air    P/E:  elderly female, comfortable in bed NAD  Psych: AAO x3  Neuro: No gross focal deficits; Power and sensation intact  CVS: S1S2 present, regular, no edema  Resp: BLAE+, No wheeze or Rhonchi  GI: Soft, BS+, mildly  tender, non distended  Extr: No  calf tenderness B/L Lower extremities  Skin: Warm and moist without any rashes    Labs:                        14.2   12.96 )-----------( 216      ( 09 Sep 2023 04:48 )             43.0   09-09    140  |  110<H>  |  18  ----------------------------<  152<H>  3.7   |  25  |  1.05    Ca    9.2      09 Sep 2023 04:48  TPro  7.9  /  Alb  3.8  /  TBili  0.3  /  DBili  x   /  AST  17  /  ALT  21  /  AlkPhos  104  09-09    D/D:  Fecal impaction with subsequent diarrhea with  Stercoral proctitis  HTN controlled  Hx Vaginal prolapse s/p TVH and Midurethral sling and ureterosacral suspension  Hx spinal stenosis     Plan:  Admit to medicine  Full Liquid diet orally  s/p disimpaction by ED attending and MAR  Bowel regimen: Miralax and Senna; Fleet Enema x once  IV Fluids  Hold Diuretic for now; Can continue ARB: Substitute Losartan for Olmesartan   Patient reports independence with ambulation  OOB to chair  DVT ppx    Discussed with patient and Son/ daughter in law at bedside Patient seen and examined with Son and Daughter in law at bedside    77 y/o Female with PMH HTN on Olmesartan and HCTZ presenting to the ED due to abdominal pain and loose stool x 1 day. Pt reports that she has had multiple episodes of loose stool since 1PM yesterday. She reports she has had been feeling bloated and constipated for at least several days, last BM with formed stool was on Monday 5 days ago. Due to the constipation, she decided to drink prune juice and an herbal tea and has been having loose stool since. She endorses a LLQ pain that started today, radiating to the back, intermittent, 5/10.  She denies fevers, chills, CP, SOB, N/V, dysuria, numbness/tingling/weakness in extremities.    No fever; Denies SOB, palpitations, chest pain, nausea, vomiting, diarrhea, constipation, dizziness. No prior history of similar complaints  Last Colonoscopy few months ago no acute pathology.    ROS: As above  PSH: Midurethral Sling, Cystoscopy ,Anterior  colporrhaphy Total Vaginal Hysterectomy Uterosacral Suspension on 3/25/2019.  Hx Spinal stenosis and Laminectomy 2012/2016    SH: Lives alone; ambulate independently except uses a cane outside; Non smoker; No alcohol use  PSH:   FH: No significant Hx Intestinal pathology    Vital Signs Last 24 Hrs  T(C): 37.6 (09 Sep 2023 11:30), Max: 37.6 (09 Sep 2023 11:30)  T(F): 99.7 (09 Sep 2023 11:30), Max: 99.7 (09 Sep 2023 11:30)  HR: 86 (09 Sep 2023 11:30) (86 - 119)  BP: 154/94 (09 Sep 2023 11:30) (148/84 - 171/108)  RR: 18 (09 Sep 2023 11:30) (18 - 19)  SpO2: 96% (09 Sep 2023 11:30) (95% - 97%) room air    P/E:  elderly female, comfortable in bed NAD  Psych: AAO x3  Neuro: No gross focal deficits; Power and sensation intact  CVS: S1S2 present, regular, no edema  Resp: BLAE+, No wheeze or Rhonchi  GI: Soft, BS+, mildly  tender, non distended  Extr: No  calf tenderness B/L Lower extremities  Skin: Warm and moist without any rashes    Labs:                        14.2   12.96 )-----------( 216      ( 09 Sep 2023 04:48 )             43.0   09-09    140  |  110<H>  |  18  ----------------------------<  152<H>  3.7   |  25  |  1.05    Ca    9.2      09 Sep 2023 04:48  TPro  7.9  /  Alb  3.8  /  TBili  0.3  /  DBili  x   /  AST  17  /  ALT  21  /  AlkPhos  104  09-09    D/D:  Fecal impaction with subsequent diarrhea with  Stercoral proctitis  HTN controlled  Hx Vaginal prolapse s/p TVH and Midurethral sling and ureterosacral suspension  Hx spinal stenosis     Plan:  Admit to medicine  Full Liquid diet orally  s/p disimpaction by ED attending and MAR  Bowel regimen: Miralax and Senna; Fleet Enema x once  IV Fluids  Hold Diuretic for now; Can continue ARB: Substitute Losartan for Olmesartan   Patient reports independence with ambulation  OOB to chair  DVT ppx    Discussed with patient and Son/ daughter in law at bedside  Discussed with PGY2 CAS Parada and ED Erwin MAY

## 2023-09-09 NOTE — ED ADULT TRIAGE NOTE - CHIEF COMPLAINT QUOTE
as per pt with diarrhea since yesterday ,no fever ,no nausea no vomiting as per pt with diarrhea,abdominal pain  since yesterday ,no fever ,no nausea no vomiting

## 2023-09-09 NOTE — H&P ADULT - NSHPREVIEWOFSYSTEMS_GEN_ALL_CORE
CONSTITUTIONAL: No fever, weight loss, or fatigue  RESPIRATORY: No cough, wheezing, chills or hemoptysis; No shortness of breath  CARDIOVASCULAR: No chest pain, palpitations, dizziness, or leg swelling  GASTROINTESTINAL: (+) LLQ abdominal pain (+) constipation (+) loose stool. No nausea, vomiting, or hematemesis; No melena or hematochezia.  GENITOURINARY: No dysuria or hematuria, urinary frequency  NEUROLOGICAL: No headaches, memory loss, loss of strength, numbness, or tremors  ENDOCRINE: No polyuria, polydipsia, or heat/cold intolerance  MUSKULOSKELETAL: No muscle aches, joint pains  HEME: no easy bruisability, no tender or enlarged lymph nodes  SKIN: No itching, burning, rashes, or lesions .

## 2023-09-10 LAB
ANION GAP SERPL CALC-SCNC: 3 MMOL/L — LOW (ref 5–17)
BUN SERPL-MCNC: 11 MG/DL — SIGNIFICANT CHANGE UP (ref 7–18)
CALCIUM SERPL-MCNC: 8.7 MG/DL — SIGNIFICANT CHANGE UP (ref 8.4–10.5)
CHLORIDE SERPL-SCNC: 108 MMOL/L — SIGNIFICANT CHANGE UP (ref 96–108)
CO2 SERPL-SCNC: 26 MMOL/L — SIGNIFICANT CHANGE UP (ref 22–31)
CREAT SERPL-MCNC: 0.74 MG/DL — SIGNIFICANT CHANGE UP (ref 0.5–1.3)
EGFR: 83 ML/MIN/1.73M2 — SIGNIFICANT CHANGE UP
GLUCOSE SERPL-MCNC: 96 MG/DL — SIGNIFICANT CHANGE UP (ref 70–99)
HCT VFR BLD CALC: 39.7 % — SIGNIFICANT CHANGE UP (ref 34.5–45)
HCV AB S/CO SERPL IA: 0.08 S/CO — SIGNIFICANT CHANGE UP (ref 0–0.99)
HCV AB SERPL-IMP: SIGNIFICANT CHANGE UP
HGB BLD-MCNC: 13 G/DL — SIGNIFICANT CHANGE UP (ref 11.5–15.5)
MAGNESIUM SERPL-MCNC: 2.6 MG/DL — SIGNIFICANT CHANGE UP (ref 1.6–2.6)
MCHC RBC-ENTMCNC: 29.1 PG — SIGNIFICANT CHANGE UP (ref 27–34)
MCHC RBC-ENTMCNC: 32.7 GM/DL — SIGNIFICANT CHANGE UP (ref 32–36)
MCV RBC AUTO: 88.8 FL — SIGNIFICANT CHANGE UP (ref 80–100)
NRBC # BLD: 0 /100 WBCS — SIGNIFICANT CHANGE UP (ref 0–0)
PHOSPHATE SERPL-MCNC: 2.7 MG/DL — SIGNIFICANT CHANGE UP (ref 2.5–4.5)
PLATELET # BLD AUTO: 194 K/UL — SIGNIFICANT CHANGE UP (ref 150–400)
POTASSIUM SERPL-MCNC: 4.2 MMOL/L — SIGNIFICANT CHANGE UP (ref 3.5–5.3)
POTASSIUM SERPL-SCNC: 4.2 MMOL/L — SIGNIFICANT CHANGE UP (ref 3.5–5.3)
RBC # BLD: 4.47 M/UL — SIGNIFICANT CHANGE UP (ref 3.8–5.2)
RBC # FLD: 14.2 % — SIGNIFICANT CHANGE UP (ref 10.3–14.5)
SODIUM SERPL-SCNC: 137 MMOL/L — SIGNIFICANT CHANGE UP (ref 135–145)
WBC # BLD: 9.14 K/UL — SIGNIFICANT CHANGE UP (ref 3.8–10.5)
WBC # FLD AUTO: 9.14 K/UL — SIGNIFICANT CHANGE UP (ref 3.8–10.5)

## 2023-09-10 PROCEDURE — 99233 SBSQ HOSP IP/OBS HIGH 50: CPT

## 2023-09-10 RX ORDER — PHENYLEPHRINE-SHARK LIVER OIL-MINERAL OIL-PETROLATUM RECTAL OINTMENT
1 OINTMENT (GRAM) RECTAL THREE TIMES A DAY
Refills: 0 | Status: DISCONTINUED | OUTPATIENT
Start: 2023-09-10 | End: 2023-09-11

## 2023-09-10 RX ADMIN — ATORVASTATIN CALCIUM 20 MILLIGRAM(S): 80 TABLET, FILM COATED ORAL at 22:34

## 2023-09-10 RX ADMIN — PHENYLEPHRINE-SHARK LIVER OIL-MINERAL OIL-PETROLATUM RECTAL OINTMENT 1 APPLICATION(S): at 22:35

## 2023-09-10 RX ADMIN — SENNA PLUS 2 TABLET(S): 8.6 TABLET ORAL at 22:34

## 2023-09-10 RX ADMIN — LOSARTAN POTASSIUM 25 MILLIGRAM(S): 100 TABLET, FILM COATED ORAL at 05:39

## 2023-09-10 RX ADMIN — PHENYLEPHRINE-SHARK LIVER OIL-MINERAL OIL-PETROLATUM RECTAL OINTMENT 1 APPLICATION(S): at 17:38

## 2023-09-10 RX ADMIN — ENOXAPARIN SODIUM 40 MILLIGRAM(S): 100 INJECTION SUBCUTANEOUS at 11:38

## 2023-09-10 NOTE — PATIENT PROFILE ADULT - FALL HARM RISK - HARM RISK INTERVENTIONS

## 2023-09-10 NOTE — PATIENT PROFILE ADULT - DO YOU FEEL LIKE HURTING YOURSELF OR OTHERS?
no Principal Discharge DX:	Back pain, chronic  Secondary Diagnosis:	Anterolisthesis of lumbar spine  Secondary Diagnosis:	Lumbar pars defect  Secondary Diagnosis:	Degenerative joint disease   1

## 2023-09-10 NOTE — PROGRESS NOTE ADULT - SUBJECTIVE AND OBJECTIVE BOX
Patient seen and examined with Son and Daughter in law at bedside    77 y/o Female with PMH HTN on Olmesartan and HCTZ presenting to the ED due to abdominal pain and loose stool x 1 day. Pt reports that she has had multiple episodes of loose stool since 1PM yesterday. She reports she has had been feeling bloated and constipated for at least several days, last BM with formed stool was on Monday 5 days ago. Due to the constipation, she decided to drink prune juice and an herbal tea and has been having loose stool since. She endorses a LLQ pain that started today, radiating to the back, intermittent, 5/10.  She denies fevers, chills, CP, SOB, N/V, dysuria, numbness/tingling/weakness in extremities. Last Colonoscopy few months ago no acute pathology.    PSH: Midurethral Sling, Cystoscopy ,Anterior  colporrhaphy Total Vaginal Hysterectomy Uterosacral Suspension on 3/25/2019.  Hx Spinal stenosis and Laminectomy 2012/2016    No fever; Denies SOB, palpitations, chest pain, nausea, vomiting, diarrhea, constipation, dizziness. No prior history of similar complaints    Vital Signs Last 24 Hrs  T(C): 36.9 (10 Sep 2023 13:49), Max: 37.6 (09 Sep 2023 16:47)  T(F): 98.4 (10 Sep 2023 13:49), Max: 99.7 (09 Sep 2023 16:47)  HR: 87 (10 Sep 2023 13:49) (76 - 87)  BP: 115/71 (10 Sep 2023 13:49) (115/71 - 147/81)  RR: 16 (10 Sep 2023 13:49) (16 - 19)  SpO2: 97% (10 Sep 2023 13:49) (95% - 99%) room air    P/E:  elderly female, comfortable in bed NAD  Psych: AAO x3  Neuro: No gross focal deficits; Power and sensation intact  CVS: S1S2 present, regular, no edema  Resp: BLAE+, No wheeze or Rhonchi  GI: Soft, BS+, mildly  tender, non distended  Extr: No  calf tenderness B/L Lower extremities  Skin: Warm and moist without any rashes    Labs:                        13.0   9.14  )-----------( 194      ( 10 Sep 2023 07:36 )             39.7   09-10    137  |  108  |  11  ----------------------------<  96  4.2   |  26  |  0.74    Ca    8.7      10 Sep 2023 07:36  Phos  2.7     09-10  Mg     2.6     09-10    TPro  7.9  /  Alb  3.8  /  TBili  0.3  /  DBili  x   /  AST  17  /  ALT  21  /  AlkPhos  104  09-09       Patient seen and examined at bedside. No overnight acute events except she had some upper left posterior shoulder area which she think due to placed her arm in a uncomfortable position for couple of hours. She mentioned feeling mild gaseous.   No abdominal pain. Bowel movement yesterday with regular stool consistency.      79 y/o Female with PMH HTN on Olmesartan and HCTZ presenting to the ED due to abdominal pain and loose stool x 1 day. Pt reports that she has had multiple episodes of loose stool since 1PM yesterday. She reports she has had been feeling bloated and constipated for at least several days, last BM with formed stool was on Monday 5 days ago. Due to the constipation, she decided to drink prune juice and an herbal tea and has been having loose stool since. She endorses a LLQ pain that started today, radiating to the back, intermittent, 5/10.  She denies fevers, chills, CP, SOB, N/V, dysuria, numbness/tingling/weakness in extremities. Last Colonoscopy few months ago no acute pathology.    PSH: Midurethral Sling, Cystoscopy ,Anterior  colporrhaphy Total Vaginal Hysterectomy Uterosacral Suspension on 3/25/2019.  Hx Spinal stenosis and Laminectomy 2012/2016    No fever; Denies SOB, palpitations, chest pain, nausea, vomiting, diarrhea, constipation, dizziness. No prior history of similar complaints    Vital Signs Last 24 Hrs  T(C): 36.9 (10 Sep 2023 13:49), Max: 37.6 (09 Sep 2023 16:47)  T(F): 98.4 (10 Sep 2023 13:49), Max: 99.7 (09 Sep 2023 16:47)  HR: 87 (10 Sep 2023 13:49) (76 - 87)  BP: 115/71 (10 Sep 2023 13:49) (115/71 - 147/81)  RR: 16 (10 Sep 2023 13:49) (16 - 19)  SpO2: 97% (10 Sep 2023 13:49) (95% - 99%) room air    P/E:  elderly female, comfortable in bed NAD  Psych: AAO x3  Neuro: No gross focal deficits; Power and sensation intact  CVS: S1S2 present, regular, no edema  Resp: Lung clear to auscultate b/l, No wheeze or Rhonchi  GI: Soft, BS+, non  tender, non distended  Extr: B/L 1+ leg edema present. No  calf tenderness B/L Lower extremities  Skin: Warm and moist without any rashes    Labs:                        13.0   9.14  )-----------( 194      ( 10 Sep 2023 07:36 )             39.7   09-10    137  |  108  |  11  ----------------------------<  96  4.2   |  26  |  0.74    Ca    8.7      10 Sep 2023 07:36  Phos  2.7     09-10  Mg     2.6     09-10    TPro  7.9  /  Alb  3.8  /  TBili  0.3  /  DBili  x   /  AST  17  /  ALT  21  /  AlkPhos  104  09-09       Patient seen and examined at bedside. No overnight acute events except she had some upper left posterior shoulder area which she think due to placed her arm in a uncomfortable position for couple of hours. She mentioned feeling mild gaseous.   No abdominal pain. Bowel movement yesterday with regular stool consistency.      77 y/o Female with PMH HTN on Olmesartan and HCTZ presenting to the ED due to abdominal pain and loose stool x 1 day. Pt reports that she has had multiple episodes of loose stool since 1PM yesterday. She reports she has had been feeling bloated and constipated for at least several days, last BM with formed stool was on Monday 5 days ago. Due to the constipation, she decided to drink prune juice and an herbal tea and has been having loose stool since. She endorses a LLQ pain that started today, radiating to the back, intermittent, 5/10.  She denies fevers, chills, CP, SOB, N/V, dysuria, numbness/tingling/weakness in extremities. Last Colonoscopy few months ago no acute pathology.    PSH: Midurethral Sling, Cystoscopy ,Anterior  colporrhaphy Total Vaginal Hysterectomy Uterosacral Suspension on 3/25/2019.  Hx Spinal stenosis and Laminectomy 2012/2016    No fever; Denies SOB, palpitations, chest pain, nausea, vomiting, diarrhea, constipation, dizziness. No prior history of similar complaints    Vital Signs Last 24 Hrs  T(C): 36.9 (10 Sep 2023 13:49), Max: 37.6 (09 Sep 2023 16:47)  T(F): 98.4 (10 Sep 2023 13:49), Max: 99.7 (09 Sep 2023 16:47)  HR: 87 (10 Sep 2023 13:49) (76 - 87)  BP: 115/71 (10 Sep 2023 13:49) (115/71 - 147/81)  RR: 16 (10 Sep 2023 13:49) (16 - 19)  SpO2: 97% (10 Sep 2023 13:49) (95% - 99%) room air    P/E:  elderly female, comfortable in bed NAD  Psych: AOA x3  Neuro: No gross focal deficits; Power and sensation intact  CVS: S1S2 present, regular, no edema  Lungs:  Lung clear to auscultate b/l, No wheeze or Rhonchi  GI: Soft, BS+, non  tender, non distended  Extremities  B/L 1+ leg edema present. No  calf tenderness B/L Lower extremities  Skin: Warm and moist without any rashes    Labs:                        13.0   9.14  )-----------( 194      ( 10 Sep 2023 07:36 )             39.7   09-10    137  |  108  |  11  ----------------------------<  96  4.2   |  26  |  0.74    Ca    8.7      10 Sep 2023 07:36  Phos  2.7     09-10  Mg     2.6     09-10    TPro  7.9  /  Alb  3.8  /  TBili  0.3  /  DBili  x   /  AST  17  /  ALT  21  /  AlkPhos  104  09-09

## 2023-09-10 NOTE — PROGRESS NOTE ADULT - ASSESSMENT
D/D:  Fecal impaction with subsequent diarrhea with  Stercoral proctitis resolving well  Leucocytosis resolved  HTN controlled  Hx Vaginal prolapse s/p TVH and Midurethral sling and ureterosacral suspension  Hx spinal stenosis     Plan:  Tolerated Full Liquid diet orally; No abdominal pain; adequate Bowel movements  Advance diet ot DASH low residue diet  Bowel regimen: Miralax and Senna;   D/C IV Fluids  IV site tender; warm compress   Hold Diuretic for now; Can continue ARB: Substitute Losartan for Olmesartan   Patient reports independence with ambulation  OOB to chair  DVT ppx    Discussed with patient and Son/ daughter in law at bedside  Discussed with PGY2 CAS Parada and TAMARA Hood RN D/D:  Fecal impaction with subsequent diarrhea with  Stercoral proctitis resolving well  Leucocytosis resolved  HTN controlled  Hx Vaginal prolapse s/p TVH and Midurethral sling and ureterosacral suspension  Hx spinal stenosis     Plan:  Tolerated Full Liquid diet orally; No abdominal pain; adequate Bowel movements  Advance diet ot DASH low residue diet  Bowel regimen: Miralax and Senna; Will hold Miralax and use PRN  D/C IV Fluids  IV site tender; warm compress   Hold Diuretic for now; Can continue ARB: Substitute Losartan for Olmesartan   Patient reports independence with ambulation  OOB to chair  DVT ppx    Discussed with patient; possible D/C Home tomorrow if tolerated diet and clinically improved; if symptoms persist GI consult AM  Discussed with RN

## 2023-09-11 ENCOUNTER — TRANSCRIPTION ENCOUNTER (OUTPATIENT)
Age: 78
End: 2023-09-11

## 2023-09-11 VITALS
SYSTOLIC BLOOD PRESSURE: 139 MMHG | HEART RATE: 81 BPM | OXYGEN SATURATION: 98 % | DIASTOLIC BLOOD PRESSURE: 84 MMHG | TEMPERATURE: 98 F | RESPIRATION RATE: 17 BRPM

## 2023-09-11 LAB
ANION GAP SERPL CALC-SCNC: 5 MMOL/L — SIGNIFICANT CHANGE UP (ref 5–17)
APPEARANCE UR: CLEAR — SIGNIFICANT CHANGE UP
BACTERIA # UR AUTO: ABNORMAL /HPF
BILIRUB UR-MCNC: NEGATIVE — SIGNIFICANT CHANGE UP
BUN SERPL-MCNC: 13 MG/DL — SIGNIFICANT CHANGE UP (ref 7–18)
CALCIUM SERPL-MCNC: 8.8 MG/DL — SIGNIFICANT CHANGE UP (ref 8.4–10.5)
CHLORIDE SERPL-SCNC: 110 MMOL/L — HIGH (ref 96–108)
CO2 SERPL-SCNC: 25 MMOL/L — SIGNIFICANT CHANGE UP (ref 22–31)
COLOR SPEC: YELLOW — SIGNIFICANT CHANGE UP
CREAT SERPL-MCNC: 0.87 MG/DL — SIGNIFICANT CHANGE UP (ref 0.5–1.3)
DIFF PNL FLD: ABNORMAL
EGFR: 69 ML/MIN/1.73M2 — SIGNIFICANT CHANGE UP
EPI CELLS # UR: PRESENT
GLUCOSE SERPL-MCNC: 99 MG/DL — SIGNIFICANT CHANGE UP (ref 70–99)
GLUCOSE UR QL: NEGATIVE MG/DL — SIGNIFICANT CHANGE UP
HCT VFR BLD CALC: 38.6 % — SIGNIFICANT CHANGE UP (ref 34.5–45)
HGB BLD-MCNC: 12.8 G/DL — SIGNIFICANT CHANGE UP (ref 11.5–15.5)
KETONES UR-MCNC: NEGATIVE MG/DL — SIGNIFICANT CHANGE UP
LEUKOCYTE ESTERASE UR-ACNC: ABNORMAL
MAGNESIUM SERPL-MCNC: 2.4 MG/DL — SIGNIFICANT CHANGE UP (ref 1.6–2.6)
MCHC RBC-ENTMCNC: 29.6 PG — SIGNIFICANT CHANGE UP (ref 27–34)
MCHC RBC-ENTMCNC: 33.2 GM/DL — SIGNIFICANT CHANGE UP (ref 32–36)
MCV RBC AUTO: 89.1 FL — SIGNIFICANT CHANGE UP (ref 80–100)
NITRITE UR-MCNC: NEGATIVE — SIGNIFICANT CHANGE UP
NRBC # BLD: 0 /100 WBCS — SIGNIFICANT CHANGE UP (ref 0–0)
PH UR: 7 — SIGNIFICANT CHANGE UP (ref 5–8)
PHOSPHATE SERPL-MCNC: 3 MG/DL — SIGNIFICANT CHANGE UP (ref 2.5–4.5)
PLATELET # BLD AUTO: 184 K/UL — SIGNIFICANT CHANGE UP (ref 150–400)
POTASSIUM SERPL-MCNC: 4 MMOL/L — SIGNIFICANT CHANGE UP (ref 3.5–5.3)
POTASSIUM SERPL-SCNC: 4 MMOL/L — SIGNIFICANT CHANGE UP (ref 3.5–5.3)
PROT UR-MCNC: NEGATIVE MG/DL — SIGNIFICANT CHANGE UP
RBC # BLD: 4.33 M/UL — SIGNIFICANT CHANGE UP (ref 3.8–5.2)
RBC # FLD: 14 % — SIGNIFICANT CHANGE UP (ref 10.3–14.5)
RBC CASTS # UR COMP ASSIST: 4 /HPF — SIGNIFICANT CHANGE UP (ref 0–4)
SODIUM SERPL-SCNC: 140 MMOL/L — SIGNIFICANT CHANGE UP (ref 135–145)
SP GR SPEC: 1.01 — SIGNIFICANT CHANGE UP (ref 1–1.03)
UROBILINOGEN FLD QL: 1 MG/DL — SIGNIFICANT CHANGE UP (ref 0.2–1)
WBC # BLD: 7.82 K/UL — SIGNIFICANT CHANGE UP (ref 3.8–10.5)
WBC # FLD AUTO: 7.82 K/UL — SIGNIFICANT CHANGE UP (ref 3.8–10.5)
WBC UR QL: 10 /HPF — HIGH (ref 0–5)

## 2023-09-11 PROCEDURE — 85025 COMPLETE CBC W/AUTO DIFF WBC: CPT

## 2023-09-11 PROCEDURE — 96372 THER/PROPH/DIAG INJ SC/IM: CPT | Mod: XU

## 2023-09-11 PROCEDURE — 74177 CT ABD & PELVIS W/CONTRAST: CPT | Mod: MA

## 2023-09-11 PROCEDURE — 85730 THROMBOPLASTIN TIME PARTIAL: CPT

## 2023-09-11 PROCEDURE — 99239 HOSP IP/OBS DSCHRG MGMT >30: CPT | Mod: GC

## 2023-09-11 PROCEDURE — 96374 THER/PROPH/DIAG INJ IV PUSH: CPT

## 2023-09-11 PROCEDURE — 87086 URINE CULTURE/COLONY COUNT: CPT

## 2023-09-11 PROCEDURE — 85027 COMPLETE CBC AUTOMATED: CPT

## 2023-09-11 PROCEDURE — 83690 ASSAY OF LIPASE: CPT

## 2023-09-11 PROCEDURE — 83735 ASSAY OF MAGNESIUM: CPT

## 2023-09-11 PROCEDURE — 96375 TX/PRO/DX INJ NEW DRUG ADDON: CPT

## 2023-09-11 PROCEDURE — 83605 ASSAY OF LACTIC ACID: CPT

## 2023-09-11 PROCEDURE — 81001 URINALYSIS AUTO W/SCOPE: CPT

## 2023-09-11 PROCEDURE — 99285 EMERGENCY DEPT VISIT HI MDM: CPT | Mod: 25

## 2023-09-11 PROCEDURE — 87077 CULTURE AEROBIC IDENTIFY: CPT

## 2023-09-11 PROCEDURE — 85610 PROTHROMBIN TIME: CPT

## 2023-09-11 PROCEDURE — 87186 SC STD MICRODIL/AGAR DIL: CPT

## 2023-09-11 PROCEDURE — 80053 COMPREHEN METABOLIC PANEL: CPT

## 2023-09-11 PROCEDURE — 86803 HEPATITIS C AB TEST: CPT

## 2023-09-11 PROCEDURE — 36415 COLL VENOUS BLD VENIPUNCTURE: CPT

## 2023-09-11 PROCEDURE — 80048 BASIC METABOLIC PNL TOTAL CA: CPT

## 2023-09-11 PROCEDURE — 84100 ASSAY OF PHOSPHORUS: CPT

## 2023-09-11 RX ORDER — GLYCERIN, PETROLATUM, PHENYLEPHRINE HCL, PRAMOXINE HCL 144; 2.5; 10; 15 MG/G; MG/G; MG/G; MG/G
1 CREAM TOPICAL
Refills: 0 | DISCHARGE

## 2023-09-11 RX ORDER — OLMESARTAN MEDOXOMIL 5 MG/1
1 TABLET, FILM COATED ORAL
Qty: 30 | Refills: 0
Start: 2023-09-11 | End: 2023-10-10

## 2023-09-11 RX ORDER — POLYETHYLENE GLYCOL 3350 17 G/17G
17 POWDER, FOR SOLUTION ORAL
Qty: 1 | Refills: 0
Start: 2023-09-11 | End: 2023-10-10

## 2023-09-11 RX ORDER — PHENYLEPHRINE-SHARK LIVER OIL-MINERAL OIL-PETROLATUM RECTAL OINTMENT
1 OINTMENT (GRAM) RECTAL
Qty: 1 | Refills: 0
Start: 2023-09-11 | End: 2023-10-10

## 2023-09-11 RX ORDER — SENNA PLUS 8.6 MG/1
2 TABLET ORAL
Qty: 0 | Refills: 0 | DISCHARGE
Start: 2023-09-11

## 2023-09-11 RX ORDER — SIMETHICONE 80 MG/1
1 TABLET, CHEWABLE ORAL
Qty: 90 | Refills: 0
Start: 2023-09-11 | End: 2023-10-10

## 2023-09-11 RX ORDER — HYDROCHLOROTHIAZIDE 25 MG
1 TABLET ORAL
Refills: 0 | DISCHARGE

## 2023-09-11 RX ORDER — ROSUVASTATIN CALCIUM 5 MG/1
1 TABLET ORAL
Qty: 30 | Refills: 0
Start: 2023-09-11 | End: 2023-10-10

## 2023-09-11 RX ORDER — ROSUVASTATIN CALCIUM 5 MG/1
1 TABLET ORAL
Refills: 0 | DISCHARGE

## 2023-09-11 RX ORDER — OLMESARTAN MEDOXOMIL 5 MG/1
1 TABLET, FILM COATED ORAL
Refills: 0 | DISCHARGE

## 2023-09-11 RX ORDER — SENNA PLUS 8.6 MG/1
2 TABLET ORAL
Qty: 60 | Refills: 0
Start: 2023-09-11 | End: 2023-10-10

## 2023-09-11 RX ORDER — HYDROCHLOROTHIAZIDE 25 MG
1 TABLET ORAL
Qty: 30 | Refills: 0
Start: 2023-09-11 | End: 2023-10-10

## 2023-09-11 RX ADMIN — POLYETHYLENE GLYCOL 3350 17 GRAM(S): 17 POWDER, FOR SOLUTION ORAL at 11:12

## 2023-09-11 RX ADMIN — LOSARTAN POTASSIUM 25 MILLIGRAM(S): 100 TABLET, FILM COATED ORAL at 05:24

## 2023-09-11 RX ADMIN — PHENYLEPHRINE-SHARK LIVER OIL-MINERAL OIL-PETROLATUM RECTAL OINTMENT 1 APPLICATION(S): at 05:25

## 2023-09-11 NOTE — DISCHARGE NOTE PROVIDER - PREFACE STATEMENT FOR MINUTES SPENT
Calm and was texting some one for a ride home     Roberto Cornell RN  07/05/19 0923 I personally spent

## 2023-09-11 NOTE — DISCHARGE NOTE NURSING/CASE MANAGEMENT/SOCIAL WORK - NURSING SECTION COMPLETE
Patient/Caregiver provided printed discharge information. Cyclophosphamide Counseling:  I discussed with the patient the risks of cyclophosphamide including but not limited to hair loss, hormonal abnormalities, decreased fertility, abdominal pain, diarrhea, nausea and vomiting, bone marrow suppression and infection. The patient understands that monitoring is required while taking this medication.

## 2023-09-11 NOTE — DISCHARGE NOTE NURSING/CASE MANAGEMENT/SOCIAL WORK - PATIENT PORTAL LINK FT
You can access the FollowMyHealth Patient Portal offered by [facility] by registering at the following website: [email or web address]. By joining [facility] FollowMyHealth portal, you will also be able to view your health information using other applications (apps) compatible with our system.

## 2023-09-11 NOTE — DISCHARGE NOTE NURSING/CASE MANAGEMENT/SOCIAL WORK - NSDCPEFALRISK_GEN_ALL_CORE
For information on Fall & Injury Prevention, visit: [email or web address] OR  [email or web address] OR  https://www.cdc.gov/steadi/patient.html

## 2023-09-11 NOTE — DISCHARGE NOTE PROVIDER - NSDCMRMEDTOKEN_GEN_ALL_CORE_FT
hydroCHLOROthiazide 12.5 mg oral tablet: 1 tab(s) orally once a day  MiraLax oral powder for reconstitution: 17 gram(s) orally once a day as needed for  constipation Please use Miralax as needed for constipation  olmesartan 40 mg oral tablet: 1 tab(s) orally once a day  rosuvastatin 5 mg oral tablet: 1 tab(s) orally once a day (at bedtime)  senna leaf extract oral tablet: 2 tab(s) orally once a day (at bedtime)  simethicone 125 mg oral capsule: 1 cap(s) orally 3 times a day (with meals)   hydroCHLOROthiazide 12.5 mg oral tablet: 1 tab(s) orally once a day  MiraLax oral powder for reconstitution: 17 gram(s) orally once a day as needed for  constipation Please use Miralax as needed for constipation  olmesartan 40 mg oral tablet: 1 tab(s) orally once a day  Preparation H Cooling 0.25% rectal gel: 1 application rectal 3 times a day  rosuvastatin 5 mg oral tablet: 1 tab(s) orally once a day (at bedtime)  senna leaf extract oral tablet: 2 tab(s) orally once a day (at bedtime)  simethicone 125 mg oral capsule: 1 cap(s) orally 3 times a day (with meals)

## 2023-09-11 NOTE — DISCHARGE NOTE PROVIDER - CARE PROVIDER_API CALL
Jeanie Moore NP in Adult Health  [phone] [place] [id number]  Phone: [phone]  Fax: [phone]  Follow Up Time:

## 2023-09-11 NOTE — DISCHARGE NOTE PROVIDER - HOSPITAL COURSE
Pt is a 77 y/o Female with PMH of HTN on Olmesartan and HCTZ who presented to the ED due to abdominal pain and loose stool x 1 day. She reported several days of bloating and constipation, with her last BM with formed stool 5 days prior. Due to the constipation, she decided to drink prune juice and an herbal tea and had loose stool since. She endorses LLQ pain, radiating to the back, intermittent, 5/10.  She denied fevers, chills, CP, SOB, N/V, dysuria, numbness/tingling/weakness in extremities. In the hospital, CTAP showed Stool distended rectum with mild rectal wall thickening and perirectal fatty stranding suggestive of stercoral proctitis. Pt had manual disimpaction and enema twice in ED. Found to have WBC 12.96, likely reactive. Given gentle hydration, bowel regimen, FLD.    Pt. has a history of HTN, takes olmesartan 40 and HCTZ 12.5 at home. Will discharge with same medications  Pt has a history of HLD, takes rosuvastatin 5 at home. Will discharge with same medicine.   In the hospital, pt. received Lovenox for prophylactic measure.

## 2023-09-11 NOTE — DISCHARGE NOTE PROVIDER - ATTENDING DISCHARGE PHYSICAL EXAMINATION:
Psych: AAO x3  Neuro: No gross focal deficits; Power and sensation intact  CVS: S1S2 present, regular,   Resp: BLAE+, No wheeze or Rhonchi  GI: Soft, BS+, Non tender, non distended  Extr: No  calf tenderness B/L Lower extremities  Trace edema B/L LE  Skin: Warm and moist without any rashes   Patient admitted with diarrhea noted to have fecal impaction and stercoral colitis s/p disimpaction in ED and treated with bowel rest and bowel regimen  Patient clinically improved, Bowel movements soft and at baseline; ambulating independently; no abdominal pain; tolerated diet    P/E:  Psych: AAO x3  Neuro: No gross focal deficits; Power and sensation intact  CVS: S1S2 present, regular,   Resp: BLAE+, No wheeze or Rhonchi  GI: Soft, BS+, Non tender, non distended  Extr: No  calf tenderness B/L Lower extremities  Trace edema B/L LE  Skin: Warm and moist without any rashes    Plan:  D/C Home  Hemorrhoidal pain added Prep H and simethicone for abdominal distension  Encouraged adequate fluid intake and green leafy vegetables and fruits  Left antecubital fossa phlebitis resolving  See discharge instructions reviewed and updated  Discussed with patient; f/u outpt GI if symptoms recur

## 2023-09-11 NOTE — DISCHARGE NOTE PROVIDER - NSDCCPCAREPLAN_GEN_ALL_CORE_FT
PRINCIPAL DISCHARGE DIAGNOSIS  Diagnosis: Stercoral colitis  Assessment and Plan of Treatment: You were admitted to the hospital because or stercoral colitis. Stercoral colitis occurs when chronic constipation leads to fecal impaction/obstruction, colonic distention and deformation, and the development of masses of dehydrated fecal material called fecalomas. The fecaloma becomes lodged within areas of the colon. To avoid symptoms of this condition, please take   Simethicone daily, with meals  Miralax, as needed for constipation  Senna 2 tablets at bedtime, for constipation.  If your constipation, abdominal pain, or gassiness persist, please see a GI doctor outpatient for further work up.      SECONDARY DISCHARGE DIAGNOSES  Diagnosis: HTN (hypertension)  Assessment and Plan of Treatment: You have a history of hypertension/high blood pressure. This can put you at risk for many serious conditions of the heart and stroke. Please continue to take your home medications, Hydrochlorathiazide and Olmesartan to manage your BP.    Diagnosis: HLD (hyperlipidemia)  Assessment and Plan of Treatment: You have hyperlipidemia. To better manage your cholesterol levels, continue taking your Statin medication daily.     PRINCIPAL DISCHARGE DIAGNOSIS  Diagnosis: Stercoral colitis  Assessment and Plan of Treatment: You was admitted to the hospital with loose watery diarrhea. You had a CT scan in ED showed fecal impaction and inflammation of rectum from impacted stool. You was disimpacted in ED as well as on admission and given Enema and stool softeners and Laxatives with resolution of constipation and more formed stools.  Stercoral colitis occurs when chronic constipation leads to fecal impaction/obstruction, colonic distention and deformation, and the development of masses of dehydrated fecal material called fecalomas. The fecaloma becomes lodged within areas of the colon. To avoid symptoms of this condition, please take   Miralax, as needed for constipation  Senna 2 tablets at bedtime, for constipation.  If your constipation, abdominal pain, or gassiness persist, please see a GI doctor outpatient for further work up.      SECONDARY DISCHARGE DIAGNOSES  Diagnosis: Gaseous abdominal distention  Assessment and Plan of Treatment: You complained of some gaseous stomach symptoms. Please take Simethicone 80 mg three times a day with meals if you feel gas symptoms.    Diagnosis: History of hemorrhoids  Assessment and Plan of Treatment: You have prior history of hemorrhoids which were bothering you due to multiple loose watery stools and constipation. Please use Preparation H three times daily as advised.    Diagnosis: HTN (hypertension)  Assessment and Plan of Treatment: You have a history of hypertension/high blood pressure. Please continue with your home medication Olmesartan and resume Hydrochlorthaizide which was hel during your stay as we were giving IV fluids. Target Blood pressure less than 130/80 mm Hg.  Exercise 20 to 30 mins daily at least 3 to 5 days a week and weight loss encouraged    Diagnosis: HLD (hyperlipidemia)  Assessment and Plan of Treatment: You have hyperlipidemia. To better manage your cholesterol levels, continue taking your Statin medication (Roscuvastatin)  daily at bed time.

## 2024-05-09 ENCOUNTER — APPOINTMENT (OUTPATIENT)
Dept: UROGYNECOLOGY | Facility: CLINIC | Age: 79
End: 2024-05-09
Payer: MEDICARE

## 2024-05-09 VITALS
BODY MASS INDEX: 34.1 KG/M2 | HEART RATE: 89 BPM | WEIGHT: 225 LBS | HEIGHT: 68 IN | DIASTOLIC BLOOD PRESSURE: 60 MMHG | SYSTOLIC BLOOD PRESSURE: 150 MMHG

## 2024-05-09 DIAGNOSIS — N81.9 FEMALE GENITAL PROLAPSE, UNSPECIFIED: ICD-10-CM

## 2024-05-09 DIAGNOSIS — K59.00 CONSTIPATION, UNSPECIFIED: ICD-10-CM

## 2024-05-09 DIAGNOSIS — N81.6 RECTOCELE: ICD-10-CM

## 2024-05-09 DIAGNOSIS — N81.11 CYSTOCELE, MIDLINE: ICD-10-CM

## 2024-05-09 LAB
BILIRUB UR QL STRIP: NORMAL
CLARITY UR: NORMAL
COLLECTION METHOD: NORMAL
GLUCOSE UR-MCNC: NORMAL
HCG UR QL: 0.2 EU/DL
HGB UR QL STRIP.AUTO: NORMAL
KETONES UR-MCNC: NORMAL
LEUKOCYTE ESTERASE UR QL STRIP: NORMAL
NITRITE UR QL STRIP: NORMAL
PH UR STRIP: 5.5
PROT UR STRIP-MCNC: NORMAL
SP GR UR STRIP: 1.02

## 2024-05-09 PROCEDURE — 51701 INSERT BLADDER CATHETER: CPT

## 2024-05-09 PROCEDURE — 81003 URINALYSIS AUTO W/O SCOPE: CPT | Mod: QW

## 2024-05-09 PROCEDURE — 99459 PELVIC EXAMINATION: CPT

## 2024-05-09 PROCEDURE — 99203 OFFICE O/P NEW LOW 30 MIN: CPT | Mod: 25

## 2024-05-11 NOTE — REASON FOR VISIT
[Friend] : friend [Questionnaire Received] : Patient questionnaire received [Intake Form Reviewed] : Patient intake form with past medical history, surgical history, family history and social history reviewed today

## 2024-05-11 NOTE — DISCUSSION/SUMMARY
[FreeTextEntry1] : #Prolapse #Chronic constipation - We reviewed her risk factors for recurrence of prolapse, particularly the severe chronic constipation. - We reviewed management options: doing nothing, PFE/PFT, pessary, surgical reconstruction. - We discussed that regardless of the approach it will be essential to get a better handle on her constipation especially given that this is likely her primary risk factor for the prolapse.  - Patient is interested in repeat surgical intervention but will follow-up GI for further management of constipation first.   #Nocturia - I recommended behavioral/dietary modifications: avoidance of bladder irritants in the evening and stopping all fluid intake 2-3 hours prior to her bedtime.

## 2024-05-11 NOTE — PROCEDURE
[FreeTextEntry1] : A sterile straight catheterization was performed to rule out a urinary tract infection and measure postvoid residual volume, which was 50 ml.

## 2024-05-11 NOTE — PHYSICAL EXAM
[Chaperone Present] : A chaperone was present in the examining room during all aspects of the physical examination [[id number]] : A chaperone was present during the pelvic exam. [Vulvar Atrophy] : vulvar atrophy [Labia Majora] : were normal [Labia Minora] : were normal [Atrophy] : atrophy [Dry Mucosa] : dry mucosa [Normal] : was normal [1] : 1 [Aa ____] : Aa [unfilled] [Ba ____] : Ba [unfilled] [C ____] : C [unfilled] [GH ____] : GH [unfilled] [PB ____] : PB [unfilled] [TVL ____] : TVL  [unfilled] [Ap ____] : Ap [unfilled] [Bp ____] : Bp [unfilled] [Absent] : absent [FreeTextEntry2] : Agnes [FreeTextEntry1] : General: Well, appearing, no acute distress HEENT: Normocephalic, atraumatic Respiratory: Speaking in full sentences comfortably, normal work of breathing and no cough during visit Extremities: No upper extremity edema noted Skin: No obvious rash or skin lesions Neuro: Alert and oriented x 3, speech is fluent, normal rate Psych: Normal mood and affect

## 2024-05-11 NOTE — HISTORY OF PRESENT ILLNESS
C-Difficile admission screening and protocol:     * Admitted with diarrhea? YES____    NO_X____     *Prior history of C-Diff. In last 3 months? YES____   NO___X__     *Antibiotic use in the past 6-8 weeks? NO___X___YES______                 If yes which  ANTIBIOTIC AND REASON______     *Prior hospitalization or nursing home in the last month?  YES____   NO_X___ [FreeTextEntry1] : Lennie Real is a 78-year-old presenting for evaluation of vaginal bulge. She is a former patient of Dr. Michael. She has a history of a TVH/BSO, US, APR in 2019. She also has a history of chronic constipation and required a hospitalization for stercoral colitis and had to have manual disimpaction. Reports had a colonoscopy in 5/2022 which per her was normal but she reports being told to return for repeat in 3 years.  She is mainly bothered by vaginal bulge sensation, which can be uncomfortable, however does not cause any pain or difficulty with emptying her bladder. No significant urinary symptoms except nocturia x 2-3. Drinks green tea in the evening, and recently hot chocolate.

## 2024-05-14 DIAGNOSIS — Z86.39 PERSONAL HISTORY OF OTHER ENDOCRINE, NUTRITIONAL AND METABOLIC DISEASE: ICD-10-CM

## 2024-05-14 RX ORDER — ROSUVASTATIN CALCIUM 5 MG/1
5 TABLET, FILM COATED ORAL
Refills: 0 | Status: ACTIVE | COMMUNITY

## 2024-05-14 RX ORDER — OLMESARTAN MEDOXOMIL 40 MG/1
40 TABLET, FILM COATED ORAL
Refills: 0 | Status: ACTIVE | COMMUNITY

## 2025-01-11 ENCOUNTER — EMERGENCY (EMERGENCY)
Facility: HOSPITAL | Age: 80
LOS: 1 days | Discharge: HOME | End: 2025-01-11
Attending: STUDENT IN AN ORGANIZED HEALTH CARE EDUCATION/TRAINING PROGRAM
Payer: MEDICARE

## 2025-01-11 VITALS
RESPIRATION RATE: 18 BRPM | SYSTOLIC BLOOD PRESSURE: 158 MMHG | HEART RATE: 74 BPM | DIASTOLIC BLOOD PRESSURE: 85 MMHG | OXYGEN SATURATION: 97 % | TEMPERATURE: 99 F

## 2025-01-11 VITALS
OXYGEN SATURATION: 96 % | DIASTOLIC BLOOD PRESSURE: 79 MMHG | SYSTOLIC BLOOD PRESSURE: 152 MMHG | RESPIRATION RATE: 17 BRPM | WEIGHT: 214.95 LBS | HEART RATE: 86 BPM | TEMPERATURE: 99 F | HEIGHT: 67 IN

## 2025-01-11 DIAGNOSIS — Z90.89 ACQUIRED ABSENCE OF OTHER ORGANS: Chronic | ICD-10-CM

## 2025-01-11 DIAGNOSIS — Z98.89 OTHER SPECIFIED POSTPROCEDURAL STATES: Chronic | ICD-10-CM

## 2025-01-11 LAB
ALBUMIN SERPL ELPH-MCNC: 3.5 G/DL — SIGNIFICANT CHANGE UP (ref 3.5–5)
ALP SERPL-CCNC: 93 U/L — SIGNIFICANT CHANGE UP (ref 40–120)
ALT FLD-CCNC: 19 U/L DA — SIGNIFICANT CHANGE UP (ref 10–60)
ANION GAP SERPL CALC-SCNC: 5 MMOL/L — SIGNIFICANT CHANGE UP (ref 5–17)
APPEARANCE UR: CLEAR — SIGNIFICANT CHANGE UP
AST SERPL-CCNC: 29 U/L — SIGNIFICANT CHANGE UP (ref 10–40)
BACTERIA # UR AUTO: ABNORMAL /HPF
BASOPHILS # BLD AUTO: 0.02 K/UL — SIGNIFICANT CHANGE UP (ref 0–0.2)
BASOPHILS NFR BLD AUTO: 0.3 % — SIGNIFICANT CHANGE UP (ref 0–2)
BILIRUB SERPL-MCNC: 0.5 MG/DL — SIGNIFICANT CHANGE UP (ref 0.2–1.2)
BILIRUB UR-MCNC: NEGATIVE — SIGNIFICANT CHANGE UP
BUN SERPL-MCNC: 17 MG/DL — SIGNIFICANT CHANGE UP (ref 7–18)
CALCIUM SERPL-MCNC: 9.4 MG/DL — SIGNIFICANT CHANGE UP (ref 8.4–10.5)
CHLORIDE SERPL-SCNC: 111 MMOL/L — HIGH (ref 96–108)
CO2 SERPL-SCNC: 26 MMOL/L — SIGNIFICANT CHANGE UP (ref 22–31)
COLOR SPEC: YELLOW — SIGNIFICANT CHANGE UP
CREAT SERPL-MCNC: 0.9 MG/DL — SIGNIFICANT CHANGE UP (ref 0.5–1.3)
DIFF PNL FLD: ABNORMAL
EGFR: 65 ML/MIN/1.73M2 — SIGNIFICANT CHANGE UP
EOSINOPHIL # BLD AUTO: 0.15 K/UL — SIGNIFICANT CHANGE UP (ref 0–0.5)
EOSINOPHIL NFR BLD AUTO: 2 % — SIGNIFICANT CHANGE UP (ref 0–6)
EPI CELLS # UR: PRESENT
GLUCOSE SERPL-MCNC: 98 MG/DL — SIGNIFICANT CHANGE UP (ref 70–99)
GLUCOSE UR QL: NEGATIVE MG/DL — SIGNIFICANT CHANGE UP
HCT VFR BLD CALC: 39.4 % — SIGNIFICANT CHANGE UP (ref 34.5–45)
HGB BLD-MCNC: 13.3 G/DL — SIGNIFICANT CHANGE UP (ref 11.5–15.5)
IMM GRANULOCYTES NFR BLD AUTO: 0.3 % — SIGNIFICANT CHANGE UP (ref 0–0.9)
KETONES UR-MCNC: NEGATIVE MG/DL — SIGNIFICANT CHANGE UP
LEUKOCYTE ESTERASE UR-ACNC: ABNORMAL
LIDOCAIN IGE QN: 21 U/L — SIGNIFICANT CHANGE UP (ref 13–75)
LYMPHOCYTES # BLD AUTO: 2.53 K/UL — SIGNIFICANT CHANGE UP (ref 1–3.3)
LYMPHOCYTES # BLD AUTO: 33.8 % — SIGNIFICANT CHANGE UP (ref 13–44)
MCHC RBC-ENTMCNC: 29.8 PG — SIGNIFICANT CHANGE UP (ref 27–34)
MCHC RBC-ENTMCNC: 33.8 G/DL — SIGNIFICANT CHANGE UP (ref 32–36)
MCV RBC AUTO: 88.3 FL — SIGNIFICANT CHANGE UP (ref 80–100)
MONOCYTES # BLD AUTO: 0.57 K/UL — SIGNIFICANT CHANGE UP (ref 0–0.9)
MONOCYTES NFR BLD AUTO: 7.6 % — SIGNIFICANT CHANGE UP (ref 2–14)
NEUTROPHILS # BLD AUTO: 4.2 K/UL — SIGNIFICANT CHANGE UP (ref 1.8–7.4)
NEUTROPHILS NFR BLD AUTO: 56 % — SIGNIFICANT CHANGE UP (ref 43–77)
NITRITE UR-MCNC: NEGATIVE — SIGNIFICANT CHANGE UP
NRBC # BLD: 0 /100 WBCS — SIGNIFICANT CHANGE UP (ref 0–0)
PH UR: 6 — SIGNIFICANT CHANGE UP (ref 5–8)
PLATELET # BLD AUTO: 189 K/UL — SIGNIFICANT CHANGE UP (ref 150–400)
POTASSIUM SERPL-MCNC: 4.5 MMOL/L — SIGNIFICANT CHANGE UP (ref 3.5–5.3)
POTASSIUM SERPL-SCNC: 4.5 MMOL/L — SIGNIFICANT CHANGE UP (ref 3.5–5.3)
PROT SERPL-MCNC: 7.4 G/DL — SIGNIFICANT CHANGE UP (ref 6–8.3)
PROT UR-MCNC: NEGATIVE MG/DL — SIGNIFICANT CHANGE UP
RBC # BLD: 4.46 M/UL — SIGNIFICANT CHANGE UP (ref 3.8–5.2)
RBC # FLD: 14 % — SIGNIFICANT CHANGE UP (ref 10.3–14.5)
RBC CASTS # UR COMP ASSIST: 3 /HPF — SIGNIFICANT CHANGE UP (ref 0–4)
SODIUM SERPL-SCNC: 142 MMOL/L — SIGNIFICANT CHANGE UP (ref 135–145)
SP GR SPEC: 1.01 — SIGNIFICANT CHANGE UP (ref 1–1.03)
UROBILINOGEN FLD QL: 0.2 MG/DL — SIGNIFICANT CHANGE UP (ref 0.2–1)
WBC # BLD: 7.49 K/UL — SIGNIFICANT CHANGE UP (ref 3.8–10.5)
WBC # FLD AUTO: 7.49 K/UL — SIGNIFICANT CHANGE UP (ref 3.8–10.5)
WBC UR QL: 5 /HPF — SIGNIFICANT CHANGE UP (ref 0–5)

## 2025-01-11 PROCEDURE — 83690 ASSAY OF LIPASE: CPT

## 2025-01-11 PROCEDURE — 74176 CT ABD & PELVIS W/O CONTRAST: CPT | Mod: MC

## 2025-01-11 PROCEDURE — 74176 CT ABD & PELVIS W/O CONTRAST: CPT | Mod: 26

## 2025-01-11 PROCEDURE — 99284 EMERGENCY DEPT VISIT MOD MDM: CPT | Mod: 25

## 2025-01-11 PROCEDURE — 85025 COMPLETE CBC W/AUTO DIFF WBC: CPT

## 2025-01-11 PROCEDURE — 96374 THER/PROPH/DIAG INJ IV PUSH: CPT

## 2025-01-11 PROCEDURE — 99284 EMERGENCY DEPT VISIT MOD MDM: CPT

## 2025-01-11 PROCEDURE — 80053 COMPREHEN METABOLIC PANEL: CPT

## 2025-01-11 PROCEDURE — 36415 COLL VENOUS BLD VENIPUNCTURE: CPT

## 2025-01-11 PROCEDURE — 81001 URINALYSIS AUTO W/SCOPE: CPT

## 2025-01-11 RX ORDER — BISACODYL 5 MG
10 TABLET, DELAYED RELEASE (ENTERIC COATED) ORAL ONCE
Refills: 0 | Status: COMPLETED | OUTPATIENT
Start: 2025-01-11 | End: 2025-01-11

## 2025-01-11 RX ORDER — VALACYCLOVIR HYDROCHLORIDE 1000 MG/1
1 TABLET, FILM COATED ORAL
Qty: 21 | Refills: 0
Start: 2025-01-11 | End: 2025-01-17

## 2025-01-11 RX ORDER — LIDOCAINE 50 MG/G
1 OINTMENT TOPICAL ONCE
Refills: 0 | Status: COMPLETED | OUTPATIENT
Start: 2025-01-11 | End: 2025-01-11

## 2025-01-11 RX ORDER — KETOROLAC TROMETHAMINE 30 MG/ML
15 INJECTION INTRAMUSCULAR; INTRAVENOUS ONCE
Refills: 0 | Status: DISCONTINUED | OUTPATIENT
Start: 2025-01-11 | End: 2025-01-11

## 2025-01-11 RX ADMIN — LIDOCAINE 1 PATCH: 50 OINTMENT TOPICAL at 16:27

## 2025-01-11 RX ADMIN — KETOROLAC TROMETHAMINE 15 MILLIGRAM(S): 30 INJECTION INTRAMUSCULAR; INTRAVENOUS at 13:19

## 2025-01-11 NOTE — ED PROVIDER NOTE - NSFOLLOWUPINSTRUCTIONS_ED_ALL_ED_FT
Continue lidocaine patch  Take Tylenol 650 mg every 6-8 hours or Motrin 400-600 mg every 6-8 hours as need for pain  Start Valacyclovir three times daily with plenty of water if you develop shingles rash  Continue your laxatives as prescribed  Follow up with primary care doctor  Seek Medical attention or return to the emergency department for any new or worsening symptoms.

## 2025-01-11 NOTE — ED PROVIDER NOTE - CLINICAL SUMMARY MEDICAL DECISION MAKING FREE TEXT BOX
78 y/o F PMH of HTN, HLD who presents with  R flank pain x 3 days.   Concern may be prodrome of HZV  Check CT a/p to r/o kidney stone  Screening labs   UA  Disposition as per above assessment. 80 y/o F PMH of HTN, HLD who presents with  R flank pain x 3 days.   Concern may be prodrome of HZV  Check CT a/p to r/o kidney stone  Screening labs   UA  Disposition as per above assessment.  Valtrex Rx precautionary in case develops HZV rash.

## 2025-01-11 NOTE — ED PROVIDER NOTE - PATIENT PORTAL LINK FT
You can access the FollowMyHealth Patient Portal offered by [facility] by registering at the following website: [email or web address]. By joining [employer]’s FollowMyHealth portal, you will also be able to view your health information using other applications (apps) compatible with our system.

## 2025-01-11 NOTE — ED PROVIDER NOTE - OBJECTIVE STATEMENT
78 y/o F PMH of HTN who presents with  R flank pain x 3 days. 78 y/o F PMH of HTN, HLD who presents with  R flank pain x 3 days.     Reports sharp pain, sometimes burning w/out obvious rash. Patient denies history of kidney stones but states she went to her MD and was told had microscopic hematuria. No f/c. States has not defecated in 2 days, which is normal for her as she suffers from constipation (takes laxatives) but feels that she may be 'backed up' No n/v.

## 2025-01-11 NOTE — ED PROVIDER NOTE - PHYSICAL EXAMINATION
GENERAL: no acute distress; well-developed  HEAD:  Atraumatic, Normocephalic  EYES: EOMI, PERRLA,  ENT: MMM; oropharynx clear  NECK: Supple, No JVD  CHEST/LUNG: Clear to auscultation bilaterally; No wheeze  HEART: Regular rate and rhythm; No murmurs, rubs, or gallops  ABDOMEN: Soft, Nontender, Nondistended; Bowel sounds present  Flank: no vesicular rash visualized   EXTREMITIES:  2+ Peripheral Pulses, No clubbing, cyanosis, or edema  PSYCH: AAOx3  NEUROLOGY: no focal motor or sensory deficits. 5/5 muscle strength in all extremities.   SKIN: No rashes or lesions

## 2025-01-11 NOTE — ED ADULT NURSE NOTE - OBJECTIVE STATEMENT
80 y/o female A/O x 3 presenting with right flank pain x 1 days. Pt endorses not having a BM in 2 days. Pt rates pain a 5/10 with no vomting or diarrhea.

## 2025-01-11 NOTE — ED ADULT TRIAGE NOTE - PATIENT ON (OXYGEN DELIVERY METHOD)
Call regarding appt. with PCP on 9/11/2023 after hospitalization.  At time of outreach call the patient feels as if their condition has improved since last visit. States she forgot to ask her PCP about flu and covid shot and if these are things that can be provided for her there. Otherwise patient states she is feeling well.  Reviewed the PCP appointment with the pt and addressed any questions or concerns.   
room air

## 2025-01-11 NOTE — ED ADULT NURSE NOTE - NSFALLRISKINTERV_ED_ALL_ED

## 2025-01-21 NOTE — ED ADULT NURSE NOTE - NSICDXPASTSURGICALHX_GEN_ALL_CORE_FT
PLAN:  *No changes to insulin doses  *Please have some protein before bed  *Aim for 30-45 grams of carbs for breakfast, 45-60 grams for lunch and dinner, 15-30 grams for snacks  *Walk for 10 minutes after meals  *GDM info    FOLLOW-UP:  Follow up with diabetes educator in 2 weeks  
PAST SURGICAL HISTORY:  H/O adenoidectomy     H/O laminectomy L4-L5- 2012 , 2016    S/P colonoscopy 2004 and 2009,2017    S/P tonsillectomy 1974    S/P tubal ligation 1977